# Patient Record
Sex: MALE | Race: BLACK OR AFRICAN AMERICAN | NOT HISPANIC OR LATINO | URBAN - METROPOLITAN AREA
[De-identification: names, ages, dates, MRNs, and addresses within clinical notes are randomized per-mention and may not be internally consistent; named-entity substitution may affect disease eponyms.]

---

## 2021-09-03 ENCOUNTER — EMERGENCY (EMERGENCY)
Facility: HOSPITAL | Age: 41
LOS: 1 days | Discharge: HOME | End: 2021-09-03
Attending: INTERNAL MEDICINE | Admitting: INTERNAL MEDICINE
Payer: COMMERCIAL

## 2021-09-03 VITALS
HEART RATE: 96 BPM | SYSTOLIC BLOOD PRESSURE: 127 MMHG | HEIGHT: 71 IN | WEIGHT: 229.94 LBS | DIASTOLIC BLOOD PRESSURE: 88 MMHG | OXYGEN SATURATION: 96 % | RESPIRATION RATE: 16 BRPM | TEMPERATURE: 100 F

## 2021-09-03 PROCEDURE — 99282 EMERGENCY DEPT VISIT SF MDM: CPT

## 2021-09-03 PROCEDURE — 99284 EMERGENCY DEPT VISIT MOD MDM: CPT

## 2021-09-03 NOTE — ED PROVIDER NOTE - CARE PROVIDER_API CALL
Ceasar Castañeda; PhD)  Infectious Disease; Internal Medicine  [address] [place] [address]  Phone: [phone]  Fax: [phone]  Follow Up Time: 1-3 Days

## 2021-09-03 NOTE — ED PROVIDER NOTE - NSICDXFAMILYHX_GEN_ALL_CORE_FT
FAMILY HISTORY:  Father  Still living? Unknown  FH: HTN (hypertension), Age at diagnosis: Age Unknown    Mother  Still living? Unknown  FH: HTN (hypertension), Age at diagnosis: Age Unknown    Sibling  Still living? Unknown  FH: HTN (hypertension), Age at diagnosis: Age Unknown

## 2021-09-03 NOTE — ED PROVIDER NOTE - NSFOLLOWUPINSTRUCTIONS_ED_ALL_ED_FT
At this time you have symptoms concerning for COVID 19. Currently you do not meet criteria for inpatient admission. You are being sent home at this time for home isolation. It is currently recommended at this time  that you isolate for the next 14 days unless further instructions are provided at a later date. All those who have been in close contact with you should also voluntary go on home isolation.   When home on home isolation please try to use your own bathroom and bedroom and limit contact with those around you as much as possible.   Contineu Tylenol per label intructions as needed for fever and body aches  Salt water rinses as needed for sore throat   Adance activity as tolerated  Please return to the ED for any concerning signs including increased difficulty breathing or signs of respiratory distress

## 2021-09-03 NOTE — ED PROVIDER NOTE - CLINICAL SUMMARY MEDICAL DECISION MAKING FREE TEXT BOX
fever found to be COVID positive,,,, cxr mild interstitial infiltrate , saturation stable.. fever controlled  stable for DC home with sat monitor and ID referral

## 2021-09-03 NOTE — ED PROVIDER NOTE - PATIENT PORTAL LINK FT
You can access the FollowMyHealth Patient Portal offered by [facility] by registering at the following website: [email or web address]. By joining [employer]’s FollowMyHealth portal, you will also be able to view your health information using other applications (apps) compatible with our system.

## 2021-09-03 NOTE — ED PROVIDER NOTE - OBJECTIVE STATEMENT
fever of 102. 4 days- came back from [place] on sunday-  fever 41 y/o male with fever x 4 days, temp max is  102 F, returned from [place] on Sunday, no cp, no sob no GI, no urinary symptoms, no stroke symptoms. He did not get good results in controlling the fever with Motrin until he began using Tylenol this evening.

## 2021-09-04 ENCOUNTER — EMERGENCY (EMERGENCY)
Facility: HOSPITAL | Age: 41
LOS: 1 days | Discharge: HOME | End: 2021-09-04
Attending: EMERGENCY MEDICINE | Admitting: EMERGENCY MEDICINE
Payer: COMMERCIAL

## 2021-09-04 VITALS
TEMPERATURE: 100 F | HEIGHT: 71 IN | SYSTOLIC BLOOD PRESSURE: 152 MMHG | OXYGEN SATURATION: 94 % | WEIGHT: 229.94 LBS | HEART RATE: 105 BPM | DIASTOLIC BLOOD PRESSURE: 85 MMHG | RESPIRATION RATE: 18 BRPM

## 2021-09-04 VITALS
TEMPERATURE: 99 F | OXYGEN SATURATION: 96 % | SYSTOLIC BLOOD PRESSURE: 135 MMHG | HEART RATE: 88 BPM | RESPIRATION RATE: 18 BRPM | DIASTOLIC BLOOD PRESSURE: 77 MMHG

## 2021-09-04 LAB
ALBUMIN SERPL ELPH-MCNC: 3.7 G/DL — SIGNIFICANT CHANGE UP (ref 3.3–5)
ALP SERPL-CCNC: 35 U/L — LOW (ref 40–120)
ALT FLD-CCNC: 42 U/L — SIGNIFICANT CHANGE UP (ref 12–78)
ANION GAP SERPL CALC-SCNC: 7 MMOL/L — SIGNIFICANT CHANGE UP (ref 5–17)
AST SERPL-CCNC: 54 U/L — HIGH (ref 15–37)
BASOPHILS # BLD AUTO: 0 K/UL — SIGNIFICANT CHANGE UP (ref 0–0.2)
BASOPHILS NFR BLD AUTO: 0 % — SIGNIFICANT CHANGE UP (ref 0–2)
BILIRUB SERPL-MCNC: 0.4 MG/DL — SIGNIFICANT CHANGE UP (ref 0.2–1.2)
BUN SERPL-MCNC: 11 MG/DL — SIGNIFICANT CHANGE UP (ref 7–23)
CALCIUM SERPL-MCNC: 8.4 MG/DL — LOW (ref 8.5–10.1)
CHLORIDE SERPL-SCNC: 102 MMOL/L — SIGNIFICANT CHANGE UP (ref 96–108)
CO2 SERPL-SCNC: 26 MMOL/L — SIGNIFICANT CHANGE UP (ref 22–31)
CREAT SERPL-MCNC: 1.2 MG/DL — SIGNIFICANT CHANGE UP (ref 0.5–1.3)
D DIMER BLD IA.RAPID-MCNC: <150 NG/ML DDU — SIGNIFICANT CHANGE UP
EOSINOPHIL # BLD AUTO: 0 K/UL — SIGNIFICANT CHANGE UP (ref 0–0.5)
EOSINOPHIL NFR BLD AUTO: 0 % — SIGNIFICANT CHANGE UP (ref 0–6)
GLUCOSE SERPL-MCNC: 94 MG/DL — SIGNIFICANT CHANGE UP (ref 70–99)
HCT VFR BLD CALC: 43.6 % — SIGNIFICANT CHANGE UP (ref 39–50)
HGB BLD-MCNC: 13.8 G/DL — SIGNIFICANT CHANGE UP (ref 13–17)
LYMPHOCYTES # BLD AUTO: 1.87 K/UL — SIGNIFICANT CHANGE UP (ref 1–3.3)
LYMPHOCYTES # BLD AUTO: 33 % — SIGNIFICANT CHANGE UP (ref 13–44)
MCHC RBC-ENTMCNC: 22.8 PG — LOW (ref 27–34)
MCHC RBC-ENTMCNC: 31.7 GM/DL — LOW (ref 32–36)
MCV RBC AUTO: 72.2 FL — LOW (ref 80–100)
MONOCYTES # BLD AUTO: 0.4 K/UL — SIGNIFICANT CHANGE UP (ref 0–0.9)
MONOCYTES NFR BLD AUTO: 7 % — SIGNIFICANT CHANGE UP (ref 2–14)
NEUTROPHILS # BLD AUTO: 2.89 K/UL — SIGNIFICANT CHANGE UP (ref 1.8–7.4)
NEUTROPHILS NFR BLD AUTO: 49 % — SIGNIFICANT CHANGE UP (ref 43–77)
NRBC # BLD: SIGNIFICANT CHANGE UP /100 WBCS (ref 0–0)
PLATELET # BLD AUTO: 193 K/UL — SIGNIFICANT CHANGE UP (ref 150–400)
POTASSIUM SERPL-MCNC: 4.7 MMOL/L — SIGNIFICANT CHANGE UP (ref 3.5–5.3)
POTASSIUM SERPL-SCNC: 4.7 MMOL/L — SIGNIFICANT CHANGE UP (ref 3.5–5.3)
PROT SERPL-MCNC: 8.4 G/DL — HIGH (ref 6–8.3)
RBC # BLD: 6.04 M/UL — HIGH (ref 4.2–5.8)
RBC # FLD: 17 % — HIGH (ref 10.3–14.5)
SARS-COV-2 RNA SPEC QL NAA+PROBE: DETECTED
SODIUM SERPL-SCNC: 135 MMOL/L — SIGNIFICANT CHANGE UP (ref 135–145)
WBC # BLD: 5.67 K/UL — SIGNIFICANT CHANGE UP (ref 3.8–10.5)
WBC # FLD AUTO: 5.67 K/UL — SIGNIFICANT CHANGE UP (ref 3.8–10.5)

## 2021-09-04 PROCEDURE — 99285 EMERGENCY DEPT VISIT HI MDM: CPT

## 2021-09-04 PROCEDURE — 71045 X-RAY EXAM CHEST 1 VIEW: CPT

## 2021-09-04 PROCEDURE — 80053 COMPREHEN METABOLIC PANEL: CPT

## 2021-09-04 PROCEDURE — 85379 FIBRIN DEGRADATION QUANT: CPT

## 2021-09-04 PROCEDURE — 71045 X-RAY EXAM CHEST 1 VIEW: CPT | Mod: 26

## 2021-09-04 PROCEDURE — 36415 COLL VENOUS BLD VENIPUNCTURE: CPT

## 2021-09-04 PROCEDURE — 87635 SARS-COV-2 COVID-19 AMP PRB: CPT

## 2021-09-04 PROCEDURE — 99285 EMERGENCY DEPT VISIT HI MDM: CPT | Mod: 25

## 2021-09-04 PROCEDURE — 85025 COMPLETE CBC W/AUTO DIFF WBC: CPT

## 2021-09-04 PROCEDURE — 93010 ELECTROCARDIOGRAM REPORT: CPT

## 2021-09-04 PROCEDURE — 96360 HYDRATION IV INFUSION INIT: CPT

## 2021-09-04 PROCEDURE — 93005 ELECTROCARDIOGRAM TRACING: CPT

## 2021-09-04 RX ORDER — ACETAMINOPHEN 500 MG
650 TABLET ORAL ONCE
Refills: 0 | Status: COMPLETED | OUTPATIENT
Start: 2021-09-04 | End: 2021-09-04

## 2021-09-04 RX ORDER — SODIUM CHLORIDE 9 MG/ML
1000 INJECTION INTRAMUSCULAR; INTRAVENOUS; SUBCUTANEOUS ONCE
Refills: 0 | Status: COMPLETED | OUTPATIENT
Start: 2021-09-04 | End: 2021-09-04

## 2021-09-04 RX ADMIN — Medication 650 MILLIGRAM(S): at 20:44

## 2021-09-04 RX ADMIN — SODIUM CHLORIDE 1000 MILLILITER(S): 9 INJECTION INTRAMUSCULAR; INTRAVENOUS; SUBCUTANEOUS at 20:13

## 2021-09-04 RX ADMIN — SODIUM CHLORIDE 1000 MILLILITER(S): 9 INJECTION INTRAMUSCULAR; INTRAVENOUS; SUBCUTANEOUS at 21:14

## 2021-09-04 RX ADMIN — Medication 650 MILLIGRAM(S): at 20:13

## 2021-09-04 NOTE — ED ADULT NURSE NOTE - NSIMPLEMENTINTERV_GEN_ALL_ED
Implemented All Universal Safety Interventions:  [place] to call system. Call bell, personal items and telephone within reach. Instruct patient to call for assistance. Room bathroom lighting operational. Non-slip footwear when patient is off stretcher. Physically safe environment: no spills, clutter or unnecessary equipment. Stretcher in lowest position, wheels locked, appropriate side rails in place.

## 2021-09-04 NOTE — ED ADULT NURSE NOTE - OBJECTIVE STATEMENT
Patient A/Ox4. Comes in Covid + since 08/31. Says he has had SOB, dizziness, vomiting, fever and generalized fatigue that has not gotten better. Currently he is 94-96% on room air. Breathing even and unlabored. Patient speaking in full sentences. IV/labs/meds/fluids/EKG done per physician orders. Call light in reach. Continuous pulse oximeter on. Will continue to monitor.

## 2021-09-04 NOTE — ED PROVIDER NOTE - PATIENT PORTAL LINK FT
You can access the FollowMyHealth Patient Portal offered by [facility] by registering at the following website: [email or web address]. By joining [employer]’s FollowMyHealth portal, you will also be able to view your health information using other applications (apps) compatible with our system.

## 2021-09-04 NOTE — ED PROVIDER NOTE - OBJECTIVE STATEMENT
39 y/o M + Covid 8/31 with c/o hypoxia at home 83-88% and fever x 3 days.  pt was seen in the ED 2 days ago and was discharged without further intervention.  Pt is not vaccinated

## 2021-09-06 ENCOUNTER — INPATIENT (INPATIENT)
Facility: HOSPITAL | Age: 41
LOS: 2 days | Discharge: HOME | DRG: 177 | End: 2021-09-09
Attending: FAMILY MEDICINE | Admitting: STUDENT IN AN ORGANIZED HEALTH CARE EDUCATION/TRAINING PROGRAM
Payer: COMMERCIAL

## 2021-09-06 VITALS
SYSTOLIC BLOOD PRESSURE: 137 MMHG | TEMPERATURE: 99 F | RESPIRATION RATE: 20 BRPM | WEIGHT: 199.96 LBS | DIASTOLIC BLOOD PRESSURE: 79 MMHG | HEART RATE: 109 BPM | HEIGHT: 71 IN | OXYGEN SATURATION: 89 %

## 2021-09-06 DIAGNOSIS — N50.819 TESTICULAR PAIN, UNSPECIFIED: ICD-10-CM

## 2021-09-06 DIAGNOSIS — Z29.9 ENCOUNTER FOR PROPHYLACTIC MEASURES, UNSPECIFIED: ICD-10-CM

## 2021-09-06 DIAGNOSIS — U07.1 COVID-19: ICD-10-CM

## 2021-09-06 LAB
ALBUMIN SERPL ELPH-MCNC: 3.7 G/DL — SIGNIFICANT CHANGE UP (ref 3.3–5)
ALP SERPL-CCNC: 39 U/L — LOW (ref 40–120)
ALT FLD-CCNC: 47 U/L — SIGNIFICANT CHANGE UP (ref 12–78)
ANION GAP SERPL CALC-SCNC: 7 MMOL/L — SIGNIFICANT CHANGE UP (ref 5–17)
AST SERPL-CCNC: 67 U/L — HIGH (ref 15–37)
BILIRUB SERPL-MCNC: 0.4 MG/DL — SIGNIFICANT CHANGE UP (ref 0.2–1.2)
BUN SERPL-MCNC: 10 MG/DL — SIGNIFICANT CHANGE UP (ref 7–23)
CALCIUM SERPL-MCNC: 8.6 MG/DL — SIGNIFICANT CHANGE UP (ref 8.5–10.1)
CHLORIDE SERPL-SCNC: 101 MMOL/L — SIGNIFICANT CHANGE UP (ref 96–108)
CO2 SERPL-SCNC: 26 MMOL/L — SIGNIFICANT CHANGE UP (ref 22–31)
CREAT SERPL-MCNC: 1.2 MG/DL — SIGNIFICANT CHANGE UP (ref 0.5–1.3)
CRP SERPL-MCNC: 72 MG/L — HIGH
D DIMER BLD IA.RAPID-MCNC: <150 NG/ML DDU — SIGNIFICANT CHANGE UP
FERRITIN SERPL-MCNC: 696 NG/ML — HIGH (ref 30–400)
GLUCOSE SERPL-MCNC: 109 MG/DL — HIGH (ref 70–99)
POTASSIUM SERPL-MCNC: 4.2 MMOL/L — SIGNIFICANT CHANGE UP (ref 3.5–5.3)
POTASSIUM SERPL-SCNC: 4.2 MMOL/L — SIGNIFICANT CHANGE UP (ref 3.5–5.3)
PROCALCITONIN SERPL-MCNC: 0.14 NG/ML — HIGH (ref 0–0.04)
PROT SERPL-MCNC: 8.6 G/DL — HIGH (ref 6–8.3)
SARS-COV-2 RNA SPEC QL NAA+PROBE: DETECTED
SODIUM SERPL-SCNC: 134 MMOL/L — LOW (ref 135–145)

## 2021-09-06 PROCEDURE — 93010 ELECTROCARDIOGRAM REPORT: CPT

## 2021-09-06 PROCEDURE — 99285 EMERGENCY DEPT VISIT HI MDM: CPT

## 2021-09-06 PROCEDURE — 71045 X-RAY EXAM CHEST 1 VIEW: CPT | Mod: 26

## 2021-09-06 PROCEDURE — 99222 1ST HOSP IP/OBS MODERATE 55: CPT

## 2021-09-06 RX ORDER — REMDESIVIR 5 MG/ML
100 INJECTION INTRAVENOUS EVERY 24 HOURS
Refills: 0 | Status: DISCONTINUED | OUTPATIENT
Start: 2021-09-07 | End: 2021-09-09

## 2021-09-06 RX ORDER — DEXAMETHASONE 0.5 MG/5ML
6 ELIXIR ORAL DAILY
Refills: 0 | Status: DISCONTINUED | OUTPATIENT
Start: 2021-09-07 | End: 2021-09-09

## 2021-09-06 RX ORDER — SODIUM CHLORIDE 9 MG/ML
1000 INJECTION INTRAMUSCULAR; INTRAVENOUS; SUBCUTANEOUS ONCE
Refills: 0 | Status: COMPLETED | OUTPATIENT
Start: 2021-09-06 | End: 2021-09-06

## 2021-09-06 RX ORDER — ACETAMINOPHEN 500 MG
650 TABLET ORAL EVERY 6 HOURS
Refills: 0 | Status: DISCONTINUED | OUTPATIENT
Start: 2021-09-06 | End: 2021-09-09

## 2021-09-06 RX ORDER — DEXAMETHASONE 0.5 MG/5ML
6 ELIXIR ORAL ONCE
Refills: 0 | Status: COMPLETED | OUTPATIENT
Start: 2021-09-06 | End: 2021-09-06

## 2021-09-06 RX ORDER — ENOXAPARIN SODIUM 100 MG/ML
40 INJECTION SUBCUTANEOUS DAILY
Refills: 0 | Status: DISCONTINUED | OUTPATIENT
Start: 2021-09-06 | End: 2021-09-09

## 2021-09-06 RX ORDER — REMDESIVIR 5 MG/ML
INJECTION INTRAVENOUS
Refills: 0 | Status: DISCONTINUED | OUTPATIENT
Start: 2021-09-06 | End: 2021-09-09

## 2021-09-06 RX ORDER — INFLUENZA VIRUS VACCINE 15; 15; 15; 15 UG/.5ML; UG/.5ML; UG/.5ML; UG/.5ML
0.5 SUSPENSION INTRAMUSCULAR ONCE
Refills: 0 | Status: DISCONTINUED | OUTPATIENT
Start: 2021-09-06 | End: 2021-09-09

## 2021-09-06 RX ORDER — REMDESIVIR 5 MG/ML
200 INJECTION INTRAVENOUS EVERY 24 HOURS
Refills: 0 | Status: COMPLETED | OUTPATIENT
Start: 2021-09-06 | End: 2021-09-06

## 2021-09-06 RX ADMIN — ENOXAPARIN SODIUM 40 MILLIGRAM(S): 100 INJECTION SUBCUTANEOUS at 14:05

## 2021-09-06 RX ADMIN — SODIUM CHLORIDE 1000 MILLILITER(S): 9 INJECTION INTRAMUSCULAR; INTRAVENOUS; SUBCUTANEOUS at 06:00

## 2021-09-06 RX ADMIN — REMDESIVIR 500 MILLIGRAM(S): 5 INJECTION INTRAVENOUS at 07:05

## 2021-09-06 RX ADMIN — Medication 6 MILLIGRAM(S): at 06:00

## 2021-09-06 NOTE — CHART NOTE - NSCHARTNOTEFT_GEN_A_CORE
39 yo M with no significant PMHx admitted for acute hypoxic respiratory failure 2/2 COVID-19 pneumonia. RN called regarding patient having "difficulty swallowing" and concern that his "throat was closing up. Pt was seen and examined at the bedside. He states that he noticed his difficulty swallowing after having dinner, which consisted of dry chicken. He denies any chest pain or difficulty breathing, and does not feel like food is getting stuck in his throat while swallowing. He denies any difficulty moving air in or out of his lungs, or any wheezing. His throat feels dry, but feels a little better if he drinks water. He denies any allergies to food or medication, and states he snores at night and has had a sleep study in the past.    Vital Signs Last 24 Hrs  T(C): 36.9 (06 Sep 2021 13:16), Max: 37.6 (06 Sep 2021 08:58)  T(F): 98.4 (06 Sep 2021 13:16), Max: 99.7 (06 Sep 2021 08:58)  HR: 104 (06 Sep 2021 13:16) (91 - 109)  BP: 153/94 (06 Sep 2021 13:16) (130/80 - 153/94)  RR: 18 (06 Sep 2021 13:16) (18 - 20)  SpO2: 90% (06 Sep 2021 13:16) (89% - 96%) on 2L nasal cannula    Physical Exam:  General: Lying in bed in NAD  HEENT: No pharyngeal erythema or edema noted  Neuro: Responding to questions and commands appropriately, moving all extremities.  Respiratory: On nasal cannula, no increased work of breathing. Decreased breath sounds at the bases but otherwise CTA b/l. No inspiratory or expiratory stridor.   CV: Tachycardic, regular rhythm. S1/S2, no murmurs, rubs or gallops.     Assessment/Plan:  39 yo M, unvaccinated for COVID, with no significant PMHx, admitted for acute hypoxic respiratory failure 2/2 COVID-19 pneumonia, now on nasal cannula and remdesivir/dexamethasone. RN called for patient complaining of difficulty swallowing.   - Appears to have stemmed from dry mouth/throat in the setting of nasal cannula and from decreased PO water intake  - No stridor appreciated on exam, very low likelihood for upper airway edema or obstruction; pt will alert RN if any changes to his breathing patterns or ability to breathe  - If pt has continued difficulties swallowing, consider speech/swallow evaluation  - RN to call for any changes 39 yo M with no significant PMHx admitted for acute hypoxic respiratory failure 2/2 COVID-19 pneumonia. RN called regarding patient having "difficulty swallowing" and concern that his "throat was closing up. Pt was seen and examined at the bedside. He states that he noticed his difficulty swallowing after having dinner, which consisted of dry chicken. He denies any chest pain or difficulty breathing, and does not feel like food is getting stuck in his throat while swallowing. He denies any difficulty moving air in or out of his lungs, or any wheezing. His throat feels dry, but feels a little better if he drinks water. He denies any allergies to food or medication, and states he snores at night and has had a sleep study in the past.    Vital Signs Last 24 Hrs  T(C): 36.9 (06 Sep 2021 13:16), Max: 37.6 (06 Sep 2021 08:58)  T(F): 98.4 (06 Sep 2021 13:16), Max: 99.7 (06 Sep 2021 08:58)  HR: 104 (06 Sep 2021 13:16) (91 - 109)  BP: 153/94 (06 Sep 2021 13:16) (130/80 - 153/94)  RR: 18 (06 Sep 2021 13:16) (18 - 20)  SpO2: 90% (06 Sep 2021 13:16) (89% - 96%) on 2L nasal cannula    Physical Exam:  General: Lying in bed in NAD  HEENT: No pharyngeal erythema or edema noted  Neuro: Responding to questions and commands appropriately, moving all extremities.  Respiratory: On nasal cannula, no increased work of breathing. Decreased breath sounds at the bases but otherwise CTA b/l. No inspiratory or expiratory stridor.   CV: Tachycardic, regular rhythm. S1/S2, no murmurs, rubs or gallops.     Assessment/Plan:  39 yo M, unvaccinated for COVID, with no significant PMHx, admitted for acute hypoxic respiratory failure 2/2 COVID-19 pneumonia, now on nasal cannula and remdesivir/dexamethasone. RN called for patient complaining of difficulty swallowing.   - Appears to have stemmed from dry mouth/throat in the setting of nasal cannula and from decreased PO water intake  - No stridor appreciated on exam, very low likelihood for upper airway edema or obstruction; pt will alert RN if any changes to his breathing patterns or ability to breathe  - If pt has continued difficulties swallowing, consider speech/swallow evaluation, if throat is still dry can switch to humidified oxygen  - RN to call for any changes

## 2021-09-06 NOTE — H&P ADULT - NSHPSOCIALHISTORY_GEN_ALL_CORE
Denies tobacco, recreational drug use  Drinks 1 beer per month    Ambulates unassisted, independent with ADLs.  Lives with his 5 children.

## 2021-09-06 NOTE — CONSULT NOTE ADULT - SUBJECTIVE AND OBJECTIVE BOX
[facility]  INFECTIOUS DISEASES   =======================================================    [facility]-[id number]  YOSSI MICHELLE     CC: COVID-19     HPI:  41 yo man with no significant PMH was admitted with hypoxia. Recently diagnosed with COVID-19 on 08/31.   Symptoms started on 8/25 as per him.   Had traveled to [place] and returned on 08/29.   Not vaccinated for COVID-19.   Received a course of Azithromycin started on 9/1 which he completed.   Was in the ED 9/3 and 9/4 but was discharged home and did not require O2 at that time. He reports oxygen desaturations to <90% at home with fevers ranging 101F - 104F per pt. He has had increased dyspnea on exertion, diarrhea.    PAST MEDICAL & SURGICAL HISTORY:  No pertinent past medical history  No significant past surgical history    Social Hx: no smoking, ETOH or drugs     FAMILY HISTORY:  FH: HTN (hypertension) (Father, Mother, Sibling)    Allergies  No Known Allergies    Antibiotics:  MEDICATIONS  (STANDING):  dexAMETHasone  Injectable 6 milliGRAM(s) IV Push daily  enoxaparin Injectable 40 milliGRAM(s) SubCutaneous daily  remdesivir  IVPB   IV Intermittent     REVIEW OF SYSTEMS:  CONSTITUTIONAL:  No Fever or chills  HEENT:  No diplopia or blurred vision.  No sore throat or runny nose.  CARDIOVASCULAR:  No chest pain or SOB.  RESPIRATORY:  +cough, +shortness of breath  GASTROINTESTINAL:  No nausea, vomiting or diarrhea.  GENITOURINARY:  No dysuria, frequency or urgency. No Blood in urine  MUSCULOSKELETAL:  no joint aches, no muscle pain  SKIN:  No change in skin, hair or nails.  NEUROLOGIC:  No paresthesias, fasciculations, seizures or weakness.  PSYCHIATRIC:  No disorder of thought or mood.  ENDOCRINE:  No heat or cold intolerance, polyuria or polydipsia.  HEMATOLOGICAL:  No easy bruising or bleeding.     Physical Exam:  Vital Signs Last 24 Hrs  T(C): 37.6 (06 Sep 2021 08:58), Max: 37.6 (06 Sep 2021 08:58)  T(F): 99.7 (06 Sep 2021 08:58), Max: 99.7 (06 Sep 2021 08:58)  HR: 91 (06 Sep 2021 08:58) (91 - 109)  BP: 130/80 (06 Sep 2021 08:58) (130/80 - 137/79)  RR: 18 (06 Sep 2021 08:58) (18 - 20)  SpO2: 95% (06 Sep 2021 08:58) (89% - 96%)  Height (cm): 180.3 (09-06 @ 05:19)  Weight (kg): 90.7 (09-06 @ 05:19)  BMI (kg/m2): 27.9 (09-06 @ 05:19)  BSA (m2): 2.11 (09-06 @ 05:19)  GEN: NAD, obese   HEENT: normocephalic and atraumatic. EOMI. PERRL.    NECK: Supple.  No lymphadenopathy   LUNGS: Clear to auscultation.   HEART: Regular rate and rhythm   ABDOMEN: Soft, nontender, and nondistended.  Positive bowel sounds.    EXTREMITIES: Without edema.  NEUROLOGIC: grossly intact.  PSYCHIATRIC: Appropriate affect .  SKIN: No rash    Labs:  09-06    134<L>  |  101  |  10  ----------------------------<  109<H>  4.2   |  26  |  1.20    Ca    8.6      06 Sep 2021 06:01    TPro  8.6<H>  /  Alb  3.7  /  TBili  0.4  /  DBili  x   /  AST  67<H>  /  ALT  47  /  AlkPhos  39<L>  09-06                        13.9   7.31  )-----------( 183      ( 06 Sep 2021 09:39 )             42.7     LIVER FUNCTIONS - ( 06 Sep 2021 06:01 )  Alb: 3.7 g/dL / Pro: 8.6 g/dL / ALK PHOS: 39 U/L / ALT: 47 U/L / AST: 67 U/L / GGT: x           All imaging and other data have been reviewed.  < from: Xray Chest 1 View- PORTABLE-Urgent (09.04.21 @ 20:10) >  IMPRESSION: Patchy infiltrate/atelectasis left base. Recommend clinical correlation and follow-up    Assessment and Plan:   41 yo man with no significant PMH was admitted with hypoxia. Recently diagnosed with COVID-19 on 08/31.   Symptoms started on 8/25 as per him.   Not vaccinated for COVID-19. On 2L O2 with NC good sat.   Treatment usually is different case by case, data suggest that these might work:   Remdisivir:  5 day course , ALT < 5X ULN  Steroid: For hypoxic patients on supplemental O2 of intubated. dexamethasone 6mg PO or IV Q-day x 10 days (equivalent to solumedrol 32mg IV or Prednisone 40mg)  Anticoagulation:  with prophylactic dosing, full dose to be considered in patients with increased risk for thromboembolic complications. Bleeding can happen but acceptable in high risk patients due to hypercoagulable state.  LMWH is good, for high risk patients consider discharge on oral anticoagulation with rivaroxaban (Xarelto) 10mg PO QD or Eliquis (apixaban) 2.5-5mg PO BID  x 4 weeks.  Currently data and recommendations for COVID-19 treatment are rapidly changing, so this treatment plan is based on my clinical judgement with available information.     COVID 19   - BMP, CBC w diff, NLR. Procalcitonin, Ferritin, CRP, LDH and D dimer for the start and periodically can be repeated.   - CXR with bilateral opacities more consistent with viral pneumonia   - Start Remdesivir 200mg stat and 100mg daily for 4 more days (total 5days) or until discharge whichever comes first.   - Start Dexamethasone 6mg po daily for 10days  - Follow Creat and LFTs daily while on Remdesivir.    - Watch O2 sat closely and taper as tolerated.   - No antibiotics at this time.  - Prophylactic anticoagulation as per protocol  - No Tocilizumab at this time.     Thank you for courtesy of this consult.     Will follow.     Dr. Sophia Martinez   Infectious Diseases   Please call [phone] between 8am and 6pm, call [phone] after 6pm or weekends.

## 2021-09-06 NOTE — ED ADULT NURSE REASSESSMENT NOTE - COMFORT CARE
meal provided/po fluids offered/repositioned/side rails up/wait time explained/warm blanket provided

## 2021-09-06 NOTE — ED PROVIDER NOTE - PHYSICAL EXAMINATION
Gen: Alert, NAD  Head/eyes: NC/AT, PERRL  ENT: airway patent  Neck: supple, no tenderness/meningismus/JVD, Trachea midline  Pulm/lung: decreased breath sounds b/l bases, normal resp effort, no wheeze/stridor/retractions  CV/heart: RRR, no M/R/G, +2 dist pulses (radial, pedal DP/PT, popliteal)  GI/Abd: soft, NT/ND, +BS, no guarding/rebound tenderness  Musculoskeletal: no edema/erythema/cyanosis, FROM in all extremities, no C/T/L spine ttp  Skin: no rash, no vesicles, no petechaie, no ecchymosis, no swelling  Neuro: AAOx3, CN 2-12 intact, normal sensation, 5/5 motor strength in all extremities, normal gait, no dysmetria

## 2021-09-06 NOTE — H&P ADULT - NSHPLABSRESULTS_GEN_ALL_CORE
13.8   5.67  )-----------( 193      ( 04 Sep 2021 20:19 )             43.6     06 Sep 2021 06:01    134    |  101    |  10     ----------------------------<  109    4.2     |  26     |  1.20     Ca    8.6        06 Sep 2021 06:01    TPro  8.6    /  Alb  3.7    /  TBili  0.4    /  DBili  x      /  AST  67     /  ALT  47     /  AlkPhos  39     06 Sep 2021 06:01    LIVER FUNCTIONS - ( 06 Sep 2021 06:01 )  Alb: 3.7 g/dL / Pro: 8.6 g/dL / ALK PHOS: 39 U/L / ALT: 47 U/L / AST: 67 U/L / GGT: x             CAPILLARY BLOOD GLUCOSE

## 2021-09-06 NOTE — H&P ADULT - PROBLEM SELECTOR PLAN 1
acute hypoxic respiratory failure 2/2 COVID-19 pneumonia  - admit w/ remote telemetry monitoring  - patient currently saturating well on 2LNC  - supplemental O2 PRN to maintain O2 sats >90%, escalate therapy as appropriate  - monitor volume status closely, will exercise caution with aggressive hydration  - tylenol prn myalgias, fever  - check QTc: _____  - daily labs to include: CBC with differential along with a CMP  - ferritin, crp, d-dimer, procal at admission then will repeat as clinically warranted  - will initiate VTE ppx with lovenox 40 mg qd  - patient to be started on dexamethasone and remdesivir, 1st dose 9/6  - no indication for tocilizumab at this time  - advanced care planning/code status: full code  - this tx plan was formulated utilizing my clinical judgement, currently available local/regional anecdotal information, organizational treatment recommendations with COVID-19 specific considerations given rapidly changing information available acute hypoxic respiratory failure 2/2 COVID-19 pneumonia  - admit w/ remote telemetry monitoring  - patient currently saturating well on 2LNC  - supplemental O2 PRN to maintain O2 sats >90%, escalate therapy as appropriate  - monitor volume status closely, will exercise caution with aggressive hydration  - tylenol prn myalgias, fever  - check QTc: 392 on EKG from 9/4, new EKG pending  - daily labs to include: CBC with differential along with a CMP  - ferritin, crp, d-dimer, procal at admission then will repeat as clinically warranted  - will initiate VTE ppx with lovenox 40 mg qd  - patient to be started on dexamethasone and remdesivir, 1st dose 9/6  - no indication for tocilizumab at this time  - advanced care planning/code status: full code  - this tx plan was formulated utilizing my clinical judgement, currently available local/regional anecdotal information, organizational treatment recommendations with COVID-19 specific considerations given rapidly changing information available

## 2021-09-06 NOTE — CONSULT NOTE ADULT - ASSESSMENT
pt with covid - viral pna - natalee - obesity - hypoxemia    o2 support  fio2 titration  pronate as tolerated  monitor VS and HD  decadron and remdesivir - for covid - viral pna with hypoxemia -   known NATALEE - does not use Device - sleep hygiene reviewed  tylenol and robitussin PRN use for sx management  dvt p  serial D dimer  emotional support

## 2021-09-06 NOTE — ED ADULT NURSE NOTE - OBJECTIVE STATEMENT
Pt to ED c/c low oxygen level secondary to Covid infection. Pt has been seen in this ED 2 x prior with covid symptoms. Pt states he has difficulty managing fever at home, and this morning noted with his O2 level at 89%. Pt denies SOB, states he does not have cough. BBS CTA

## 2021-09-06 NOTE — H&P ADULT - PROBLEM SELECTOR PLAN 2
lovenox 40 mg qd for dvt ppx - pt c/o testicular pain, L>R with mild L testicular tenderness on exam  - will order testicular ultrasound

## 2021-09-06 NOTE — ED ADULT TRIAGE NOTE - CHIEF COMPLAINT QUOTE
Patient Covid + since 08/31. Was seen in this ED a few days ago with stable O2. O2 sat now 89% on room air. Endorses fever, dizziness and fatigue persistent. Took Tylenol PTA. Patient taken straight to room to finish triage.

## 2021-09-06 NOTE — CONSULT NOTE ADULT - SUBJECTIVE AND OBJECTIVE BOX
Date/Time Patient Seen:  		  Referring MD:   Data Reviewed	       Patient is a 40y old  Male who presents with a chief complaint of acute hypoxic resp failure 2/2 COVID-19 (06 Sep 2021 09:56)      Subjective/HPI  in bed  seen and examined  vs noted  covid pos  on o2 support  lives with family  h and p reviewed  er provider note reviewed         PAST MEDICAL & SURGICAL HISTORY:  No pertinent past medical history    No significant past surgical history    39 yo M, unvaccinated for COVID, with no significant PMHx presents to the ED c/o hypoxia. Recently diagnosed with COVID-19 on 08/31. Had traveled to [place] and returned on 08/29. Not vaccinated for COVID-19. Received a course of Azithromycin started on 9/1 which he completed today. Has been taking Tylenol as needed with minimal relief.   Was in the ED 9/3 and 9/4 but was discharged home and did not require O2 at that time. He reports oxygen desaturations to <90% at home with fevers ranging 101F - 104F per pt. He has had increased dyspnea on exertion, diarrhea (nonbloody), loss of taste, but denies loss of smell.     In the ED, VS T99.2F  -> 97 /79 RR 20 SpO2 89% on RA -> 96% on 2LNC. CBC, CMP, CRP, Ferritin, Procal, D-dimer collected in the ED. Significant for   Received 6 mg Dexamethasone IV x1, 1L NS bolus x1 in the ED.  CXR showing L base infiltrate, official read pending  EKG from prior visit reviewed showing NSR with nonspecific T wave abnormality, no prior EKG for comparison, new EKG pending    FAMILY HISTORY:  Father  Still living? Unknown  FH: HTN (hypertension), Age at diagnosis: Age Unknown    Mother  Still living? Unknown  FH: HTN (hypertension), Age at diagnosis: Age Unknown    Sibling  Still living? Unknown  FH: HTN (hypertension), Age at diagnosis: Age Unknown.     Social History:  Social History (marital status, living situation, occupation, tobacco use, alcohol and drug use, and sexual history): Denies tobacco, recreational drug use  Drinks 1 beer per month    Ambulates unassisted, independent with ADLs.  Lives with his 5 children.     Tobacco Screening:  · Core Measure Site	Yes  · Has the patient used tobacco in the past 30 days?	No    Risk Assessment:    Present on Admission:  Deep Venous Thrombosis	no  Pulmonary Embolus	no     Heart Failure:  Does this patient have a history of or has been diagnosed with heart failure? no.    HIV Screen (per [facility], HIV screening must be offered to every individual between ages 13 and 64)	Unable to offer due to clinical condition        Medication list         MEDICATIONS  (STANDING):  dexAMETHasone  Injectable 6 milliGRAM(s) IV Push daily  enoxaparin Injectable 40 milliGRAM(s) SubCutaneous daily  influenza   Vaccine 0.5 milliLiter(s) IntraMuscular once  remdesivir  IVPB   IV Intermittent     MEDICATIONS  (PRN):  acetaminophen   Tablet .. 650 milliGRAM(s) Oral every 6 hours PRN Temp greater or equal to 38C (100.4F)         Vitals log        ICU Vital Signs Last 24 Hrs  T(C): 37.2 (06 Sep 2021 10:03), Max: 37.6 (06 Sep 2021 08:58)  T(F): 99 (06 Sep 2021 10:03), Max: 99.7 (06 Sep 2021 08:58)  HR: 102 (06 Sep 2021 10:03) (91 - 109)  BP: 142/83 (06 Sep 2021 10:03) (130/80 - 142/83)  BP(mean): --  ABP: --  ABP(mean): --  RR: 18 (06 Sep 2021 10:03) (18 - 20)  SpO2: 89% (06 Sep 2021 10:03) (89% - 96%)           Input and Output:  I&O's Detail      Lab Data                        13.9   7.31  )-----------( 183      ( 06 Sep 2021 09:39 )             42.7     09-06    134<L>  |  101  |  10  ----------------------------<  109<H>  4.2   |  26  |  1.20    Ca    8.6      06 Sep 2021 06:01    TPro  8.6<H>  /  Alb  3.7  /  TBili  0.4  /  DBili  x   /  AST  67<H>  /  ALT  47  /  AlkPhos  39<L>  09-06            Review of Systems	  cough      Objective     Physical Examination    heart s1s2  lung dec BS  abd soft  head nc  obese  cn grossly int  verbal  alert  moves all extr      Pertinent Lab findings & Imaging      Bautista:  NO   Adequate UO     I&O's Detail           Discussed with:     Cultures:	        Radiology        EXAM:  XR CHEST AP OR PA 1V                            PROCEDURE DATE:  09/06/2021          INTERPRETATION:  Cough.    AP chest. Prior 9/4/2021.    IMPRESSION: Patchy infiltrate left base similar to prior possible Covid 19 infection. No pleural effusion or other new abnormality.    --- End of Report ---            ANGIE ONTIVEROS MD; Attending Radiologist  This document has been electronically signed. Sep  6 2021 10:39AM

## 2021-09-06 NOTE — H&P ADULT - HISTORY OF PRESENT ILLNESS
39 yo M with no significant PMHx presents to the ED c/o hypoxia. Recently diagnosed with COVID-19 on 08/31. Had traveled to [place] and returned on 08/29. Not vaccinated for COVID-19. Received a course of Azithromycin started on 9/1 which he completed today.  Was in the ED 9/4 and 9/5 but was discharged home and did not require O2 at that time.     In the ED, VS T99.2F  -> 97 /79 RR 20 SpO2 89% on RA -> 96% on 2LNC. CBC, CMP, CRP, Ferritin, Procal, D-dimer collected in the ED. Significant for   Received 6 mg Dexamethasone IV x1, 1L NS bolus x1 in the ED.  CXR showing L base infiltrate, official read pending  EKG showing __________ 39 yo M with no significant PMHx presents to the ED c/o hypoxia. Recently diagnosed with COVID-19 on 08/31. Had traveled to [place] and returned on 08/29. Not vaccinated for COVID-19. Received a course of Azithromycin started on 9/1 which he completed today.  Was in the ED 9/3 and 9/4 but was discharged home and did not require O2 at that time.     In the ED, VS T99.2F  -> 97 /79 RR 20 SpO2 89% on RA -> 96% on 2LNC. CBC, CMP, CRP, Ferritin, Procal, D-dimer collected in the ED. Significant for   Received 6 mg Dexamethasone IV x1, 1L NS bolus x1 in the ED.  CXR showing L base infiltrate, official read pending  EKG showing __________ 39 yo M with no significant PMHx presents to the ED c/o hypoxia. Recently diagnosed with COVID-19 on 08/31. Had traveled to [place] and returned on 08/29. Not vaccinated for COVID-19. Received a course of Azithromycin started on 9/1 which he completed today. Has been taking Tylenol as needed with minimal relief.   Was in the ED 9/3 and 9/4 but was discharged home and did not require O2 at that time.     In the ED, VS T99.2F  -> 97 /79 RR 20 SpO2 89% on RA -> 96% on 2LNC. CBC, CMP, CRP, Ferritin, Procal, D-dimer collected in the ED. Significant for   Received 6 mg Dexamethasone IV x1, 1L NS bolus x1 in the ED.  CXR showing L base infiltrate, official read pending  EKG showing __________ 39 yo M, unvaccinated for COVID, with no significant PMHx presents to the ED c/o hypoxia. Recently diagnosed with COVID-19 on 08/31. Had traveled to [place] and returned on 08/29. Not vaccinated for COVID-19. Received a course of Azithromycin started on 9/1 which he completed today. Has been taking Tylenol as needed with minimal relief.   Was in the ED 9/3 and 9/4 but was discharged home and did not require O2 at that time. He reports oxygen desaturations to <90% at home with fevers ranging 101F - 104F per pt. He has had increased dyspnea on exertion, diarrhea (nonbloody), loss of taste, but denies loss of smell.     In the ED, VS T99.2F  -> 97 /79 RR 20 SpO2 89% on RA -> 96% on 2LNC. CBC, CMP, CRP, Ferritin, Procal, D-dimer collected in the ED. Significant for   Received 6 mg Dexamethasone IV x1, 1L NS bolus x1 in the ED.  CXR showing L base infiltrate, official read pending  EKG from prior visit reviewed showing NSR with nonspecific T wave abnormality, no prior EKG for comparison, new EKG pending

## 2021-09-06 NOTE — ED PROVIDER NOTE - OBJECTIVE STATEMENT
39 yo male states no pmhx +covid 8/31 here with persistent fever, hypoxia 89% tonight, taking tylenol for fever.  Minimal cough.  No chest pain.  Seen in ED 2x prior to today.  Not vaccinated

## 2021-09-06 NOTE — H&P ADULT - ASSESSMENT
41 yo M with no significant PMHx presents to the ED c/o hypoxia, admitted for acute hypoxic respiratory failure 2/2 COVID-19 pneumonia       39 yo M, unvaccinated for COVID, with no significant PMHx presents to the ED c/o hypoxia, admitted for acute hypoxic respiratory failure 2/2 COVID-19 pneumonia

## 2021-09-06 NOTE — H&P ADULT - NSHPREVIEWOFSYSTEMS_GEN_ALL_CORE
CONSTITUTIONAL: admits fever, denies chills, fatigue, weakness  HEENT: denies blurred visions, sore throat  SKIN: denies new lesions, rash  CARDIOVASCULAR: denies chest pain, chest pressure, palpitations  RESPIRATORY: admits shortness of breath, sputum production  GASTROINTESTINAL: denies nausea, vomiting, diarrhea, abdominal pain  GENITOURINARY: denies dysuria, discharge  NEUROLOGICAL: denies numbness, headache, focal weakness  MUSCULOSKELETAL: denies new joint pain, muscle aches  HEMATOLOGIC: denies gross bleeding, bruising  LYMPHATICS: denies enlarged lymph nodes, extremity swelling  PSYCHIATRIC: denies recent changes in anxiety, depression  ENDOCRINOLOGIC: denies sweating, cold or heat intolerance CONSTITUTIONAL: admits fever, fatigue, dizziness, denies chills  HEENT: denies blurred visions, sore throat  SKIN: denies new lesions, rash  CARDIOVASCULAR: denies chest pain, chest pressure, palpitations  RESPIRATORY: admits shortness of breath, sputum production  GASTROINTESTINAL: denies nausea, vomiting, diarrhea, abdominal pain  GENITOURINARY: denies dysuria, discharge  NEUROLOGICAL: denies numbness, headache, focal weakness  MUSCULOSKELETAL: admits R groin pain, denies new joint pain  HEMATOLOGIC: denies gross bleeding, bruising  LYMPHATICS: denies enlarged lymph nodes, extremity swelling  PSYCHIATRIC: denies recent changes in anxiety, depression  ENDOCRINOLOGIC: denies sweating, cold or heat intolerance CONSTITUTIONAL: admits fever, chills, fatigue, denies dizziness  HEENT: denies blurred visions, sore throat  SKIN: denies new lesions, rash  CARDIOVASCULAR: denies chest pain, chest pressure, palpitations  RESPIRATORY: admits shortness of breath, denies sputum production  GASTROINTESTINAL: admits nausea, vomiting, diarrhea, denies abdominal pain  GENITOURINARY: denies dysuria, discharge,   NEUROLOGICAL: denies numbness, headache, focal weakness  MUSCULOSKELETAL: admits R groin pain, denies new joint pain  HEMATOLOGIC: denies gross bleeding, bruising  LYMPHATICS: denies enlarged lymph nodes, extremity swelling  PSYCHIATRIC: denies recent changes in anxiety, depression  ENDOCRINOLOGIC: denies sweating, cold or heat intolerance CONSTITUTIONAL: admits fever, chills, fatigue, denies dizziness  HEENT: denies blurred visions, sore throat  SKIN: denies new lesions, rash  CARDIOVASCULAR: denies chest pain, chest pressure, palpitations  RESPIRATORY: admits shortness of breath, denies sputum production  GASTROINTESTINAL: admits nausea, vomiting, diarrhea, denies abdominal pain  GENITOURINARY: denies dysuria, discharge admits testicular pain  NEUROLOGICAL: denies numbness, headache, focal weakness  MUSCULOSKELETAL: denies new joint pain  HEMATOLOGIC: denies gross bleeding, bruising  LYMPHATICS: denies enlarged lymph nodes, extremity swelling  PSYCHIATRIC: denies recent changes in anxiety, depression  ENDOCRINOLOGIC: denies sweating, cold or heat intolerance

## 2021-09-06 NOTE — H&P ADULT - NSHPPHYSICALEXAM_GEN_ALL_CORE
T(C): 37.3 (09-06-21 @ 05:19), Max: 37.3 (09-06-21 @ 05:19)  HR: 97 (09-06-21 @ 05:23) (97 - 109)  BP: 137/79 (09-06-21 @ 05:19) (137/79 - 137/79)  RR: 20 (09-06-21 @ 05:23) (20 - 20)  SpO2: 96% (09-06-21 @ 05:23) (89% - 96%)    GENERAL: patient appears well, no acute distress, appropriate, pleasant, on 2LNC  EYES: sclera clear, no exudates  ENMT: oropharynx clear without erythema, no exudates, moist mucous membranes  NECK: supple, soft, no thyromegaly noted  LUNGS: decreased breath sounds b/l  HEART: soft S1/S2, regular rate and rhythm, no murmurs noted, no lower extremity edema  GASTROINTESTINAL: abdomen is soft, nontender, nondistended, normoactive bowel sounds, no palpable masses  INTEGUMENT: good skin turgor, no lesions noted  MUSCULOSKELETAL: no clubbing or cyanosis, no obvious deformity  NEUROLOGIC: awake, alert, oriented x3, good muscle tone in 4 extremities, no obvious sensory deficits  PSYCHIATRIC: mood is good, affect is congruent, linear and logical thought process  HEME/LYMPH: no palpable supraclavicular nodules, no obvious ecchymosis or petechiae T(C): 37.3 (09-06-21 @ 05:19), Max: 37.3 (09-06-21 @ 05:19)  HR: 97 (09-06-21 @ 05:23) (97 - 109)  BP: 137/79 (09-06-21 @ 05:19) (137/79 - 137/79)  RR: 20 (09-06-21 @ 05:23) (20 - 20)  SpO2: 96% (09-06-21 @ 05:23) (89% - 96%)    GENERAL: patient appears well, no acute distress, appropriate, pleasant, on 2LNC  EYES: sclera clear, no exudates  ENMT: oropharynx clear without erythema, no exudates, moist mucous membranes  NECK: supple, soft, no thyromegaly noted  LUNGS: decreased breath sounds b/l  HEART: soft S1/S2, regular rate and rhythm, no murmurs noted, no lower extremity edema  GASTROINTESTINAL: abdomen is soft, nontender, nondistended, normoactive bowel sounds, no palpable masses  GENITOURINARY: uncircumcised male with normal appearing testes bilaterally, L testicle mildly tender to palpation  INTEGUMENT: good skin turgor, warm, dry, normal color for race  MUSCULOSKELETAL: no clubbing or cyanosis, no obvious deformity  NEUROLOGIC: awake, alert, oriented x3, good muscle tone in 4 extremities, no obvious sensory deficits  PSYCHIATRIC: mood is good, affect is congruent, linear and logical thought process  HEME/LYMPH: no palpable supraclavicular nodules, no obvious ecchymosis or petechiae

## 2021-09-07 LAB
A1C WITH ESTIMATED AVERAGE GLUCOSE RESULT: 6.3 % — HIGH (ref 4–5.6)
ALBUMIN SERPL ELPH-MCNC: 3.6 G/DL — SIGNIFICANT CHANGE UP (ref 3.3–5)
ALP SERPL-CCNC: 39 U/L — LOW (ref 40–120)
ALT FLD-CCNC: 68 U/L — SIGNIFICANT CHANGE UP (ref 12–78)
ANION GAP SERPL CALC-SCNC: 6 MMOL/L — SIGNIFICANT CHANGE UP (ref 5–17)
AST SERPL-CCNC: 79 U/L — HIGH (ref 15–37)
BASOPHILS # BLD AUTO: 0 K/UL — SIGNIFICANT CHANGE UP (ref 0–0.2)
BASOPHILS NFR BLD AUTO: 0 % — SIGNIFICANT CHANGE UP (ref 0–2)
BILIRUB SERPL-MCNC: 0.3 MG/DL — SIGNIFICANT CHANGE UP (ref 0.2–1.2)
BUN SERPL-MCNC: 18 MG/DL — SIGNIFICANT CHANGE UP (ref 7–23)
CALCIUM SERPL-MCNC: 8.7 MG/DL — SIGNIFICANT CHANGE UP (ref 8.5–10.1)
CHLORIDE SERPL-SCNC: 103 MMOL/L — SIGNIFICANT CHANGE UP (ref 96–108)
CO2 SERPL-SCNC: 30 MMOL/L — SIGNIFICANT CHANGE UP (ref 22–31)
COVID-19 SPIKE DOMAIN AB INTERP: NEGATIVE — SIGNIFICANT CHANGE UP
COVID-19 SPIKE DOMAIN ANTIBODY RESULT: 0.4 U/ML — SIGNIFICANT CHANGE UP
CREAT SERPL-MCNC: 1.1 MG/DL — SIGNIFICANT CHANGE UP (ref 0.5–1.3)
EOSINOPHIL # BLD AUTO: 0 K/UL — SIGNIFICANT CHANGE UP (ref 0–0.5)
EOSINOPHIL NFR BLD AUTO: 0 % — SIGNIFICANT CHANGE UP (ref 0–6)
ESTIMATED AVERAGE GLUCOSE: 134 MG/DL — HIGH (ref 68–114)
GLUCOSE SERPL-MCNC: 99 MG/DL — SIGNIFICANT CHANGE UP (ref 70–99)
HCT VFR BLD CALC: 45 % — SIGNIFICANT CHANGE UP (ref 39–50)
HGB BLD-MCNC: 14.1 G/DL — SIGNIFICANT CHANGE UP (ref 13–17)
LYMPHOCYTES # BLD AUTO: 1.22 K/UL — SIGNIFICANT CHANGE UP (ref 1–3.3)
LYMPHOCYTES # BLD AUTO: 17 % — SIGNIFICANT CHANGE UP (ref 13–44)
MCHC RBC-ENTMCNC: 22.7 PG — LOW (ref 27–34)
MCHC RBC-ENTMCNC: 31.3 GM/DL — LOW (ref 32–36)
MCV RBC AUTO: 72.3 FL — LOW (ref 80–100)
MONOCYTES # BLD AUTO: 0.43 K/UL — SIGNIFICANT CHANGE UP (ref 0–0.9)
MONOCYTES NFR BLD AUTO: 6 % — SIGNIFICANT CHANGE UP (ref 2–14)
NEUTROPHILS # BLD AUTO: 5.39 K/UL — SIGNIFICANT CHANGE UP (ref 1.8–7.4)
NEUTROPHILS NFR BLD AUTO: 74 % — SIGNIFICANT CHANGE UP (ref 43–77)
NRBC # BLD: SIGNIFICANT CHANGE UP /100 WBCS (ref 0–0)
PLATELET # BLD AUTO: 228 K/UL — SIGNIFICANT CHANGE UP (ref 150–400)
POTASSIUM SERPL-MCNC: 4.9 MMOL/L — SIGNIFICANT CHANGE UP (ref 3.5–5.3)
POTASSIUM SERPL-SCNC: 4.9 MMOL/L — SIGNIFICANT CHANGE UP (ref 3.5–5.3)
PROT SERPL-MCNC: 8.7 G/DL — HIGH (ref 6–8.3)
RBC # BLD: 6.22 M/UL — HIGH (ref 4.2–5.8)
RBC # FLD: 17.7 % — HIGH (ref 10.3–14.5)
SARS-COV-2 IGG+IGM SERPL QL IA: 0.4 U/ML — SIGNIFICANT CHANGE UP
SARS-COV-2 IGG+IGM SERPL QL IA: NEGATIVE — SIGNIFICANT CHANGE UP
SODIUM SERPL-SCNC: 139 MMOL/L — SIGNIFICANT CHANGE UP (ref 135–145)
WBC # BLD: 7.18 K/UL — SIGNIFICANT CHANGE UP (ref 3.8–10.5)
WBC # FLD AUTO: 7.18 K/UL — SIGNIFICANT CHANGE UP (ref 3.8–10.5)

## 2021-09-07 PROCEDURE — 99233 SBSQ HOSP IP/OBS HIGH 50: CPT

## 2021-09-07 PROCEDURE — 76870 US EXAM SCROTUM: CPT | Mod: 26

## 2021-09-07 PROCEDURE — 99232 SBSQ HOSP IP/OBS MODERATE 35: CPT

## 2021-09-07 PROCEDURE — 93975 VASCULAR STUDY: CPT | Mod: 26

## 2021-09-07 RX ADMIN — REMDESIVIR 500 MILLIGRAM(S): 5 INJECTION INTRAVENOUS at 05:55

## 2021-09-07 RX ADMIN — ENOXAPARIN SODIUM 40 MILLIGRAM(S): 100 INJECTION SUBCUTANEOUS at 12:02

## 2021-09-07 RX ADMIN — Medication 6 MILLIGRAM(S): at 05:54

## 2021-09-08 LAB
ALBUMIN SERPL ELPH-MCNC: 3.3 G/DL — SIGNIFICANT CHANGE UP (ref 3.3–5)
ALP SERPL-CCNC: 38 U/L — LOW (ref 40–120)
ALT FLD-CCNC: 95 U/L — HIGH (ref 12–78)
ANION GAP SERPL CALC-SCNC: 7 MMOL/L — SIGNIFICANT CHANGE UP (ref 5–17)
AST SERPL-CCNC: 81 U/L — HIGH (ref 15–37)
BASOPHILS # BLD AUTO: 0.02 K/UL — SIGNIFICANT CHANGE UP (ref 0–0.2)
BASOPHILS NFR BLD AUTO: 0.2 % — SIGNIFICANT CHANGE UP (ref 0–2)
BILIRUB SERPL-MCNC: 0.5 MG/DL — SIGNIFICANT CHANGE UP (ref 0.2–1.2)
BUN SERPL-MCNC: 21 MG/DL — SIGNIFICANT CHANGE UP (ref 7–23)
CALCIUM SERPL-MCNC: 8.7 MG/DL — SIGNIFICANT CHANGE UP (ref 8.5–10.1)
CHLORIDE SERPL-SCNC: 102 MMOL/L — SIGNIFICANT CHANGE UP (ref 96–108)
CO2 SERPL-SCNC: 29 MMOL/L — SIGNIFICANT CHANGE UP (ref 22–31)
CREAT SERPL-MCNC: 1.1 MG/DL — SIGNIFICANT CHANGE UP (ref 0.5–1.3)
EOSINOPHIL # BLD AUTO: 0 K/UL — SIGNIFICANT CHANGE UP (ref 0–0.5)
EOSINOPHIL NFR BLD AUTO: 0 % — SIGNIFICANT CHANGE UP (ref 0–6)
GLUCOSE SERPL-MCNC: 94 MG/DL — SIGNIFICANT CHANGE UP (ref 70–99)
HCT VFR BLD CALC: 45 % — SIGNIFICANT CHANGE UP (ref 39–50)
HGB BLD-MCNC: 14.2 G/DL — SIGNIFICANT CHANGE UP (ref 13–17)
IMM GRANULOCYTES NFR BLD AUTO: 1.5 % — SIGNIFICANT CHANGE UP (ref 0–1.5)
LYMPHOCYTES # BLD AUTO: 1.64 K/UL — SIGNIFICANT CHANGE UP (ref 1–3.3)
LYMPHOCYTES # BLD AUTO: 19.1 % — SIGNIFICANT CHANGE UP (ref 13–44)
MCHC RBC-ENTMCNC: 22.8 PG — LOW (ref 27–34)
MCHC RBC-ENTMCNC: 31.6 GM/DL — LOW (ref 32–36)
MCV RBC AUTO: 72.3 FL — LOW (ref 80–100)
MONOCYTES # BLD AUTO: 0.6 K/UL — SIGNIFICANT CHANGE UP (ref 0–0.9)
MONOCYTES NFR BLD AUTO: 7 % — SIGNIFICANT CHANGE UP (ref 2–14)
NEUTROPHILS # BLD AUTO: 6.2 K/UL — SIGNIFICANT CHANGE UP (ref 1.8–7.4)
NEUTROPHILS NFR BLD AUTO: 72.2 % — SIGNIFICANT CHANGE UP (ref 43–77)
NRBC # BLD: 0 /100 WBCS — SIGNIFICANT CHANGE UP (ref 0–0)
PLATELET # BLD AUTO: 286 K/UL — SIGNIFICANT CHANGE UP (ref 150–400)
POTASSIUM SERPL-MCNC: 4.7 MMOL/L — SIGNIFICANT CHANGE UP (ref 3.5–5.3)
POTASSIUM SERPL-SCNC: 4.7 MMOL/L — SIGNIFICANT CHANGE UP (ref 3.5–5.3)
PROT SERPL-MCNC: 8.5 G/DL — HIGH (ref 6–8.3)
RBC # BLD: 6.22 M/UL — HIGH (ref 4.2–5.8)
RBC # FLD: 17.6 % — HIGH (ref 10.3–14.5)
SODIUM SERPL-SCNC: 138 MMOL/L — SIGNIFICANT CHANGE UP (ref 135–145)
WBC # BLD: 8.59 K/UL — SIGNIFICANT CHANGE UP (ref 3.8–10.5)
WBC # FLD AUTO: 8.59 K/UL — SIGNIFICANT CHANGE UP (ref 3.8–10.5)

## 2021-09-08 PROCEDURE — 99233 SBSQ HOSP IP/OBS HIGH 50: CPT

## 2021-09-08 RX ADMIN — Medication 6 MILLIGRAM(S): at 06:34

## 2021-09-08 RX ADMIN — ENOXAPARIN SODIUM 40 MILLIGRAM(S): 100 INJECTION SUBCUTANEOUS at 11:36

## 2021-09-08 RX ADMIN — REMDESIVIR 500 MILLIGRAM(S): 5 INJECTION INTRAVENOUS at 06:34

## 2021-09-08 NOTE — PROGRESS NOTE ADULT - PROBLEM SELECTOR PLAN 2
- pt c/o testicular pain, L>R with mild L testicular tenderness on exam  - testicular ultrasound was ordered and negative for torsion, noted to have b/l hydrocele
- pt c/o testicular pain, L>R with mild L testicular tenderness on exam  - testicular ultrasound was ordered and negative for torsion, noted to have b/l hydrocele

## 2021-09-08 NOTE — PROGRESS NOTE ADULT - PROBLEM SELECTOR PLAN 1
acute hypoxic respiratory failure 2/2 COVID-19 pneumonia  - patient currently saturating well on 2LNC  - supplemental O2 PRN to maintain O2 sats >90%, escalate therapy as appropriate  - monitor volume status closely, will exercise caution with aggressive hydration  - tylenol prn myalgias, fever  - daily labs to include: CBC with differential along with a CMP  - ferritin, crp, d-dimer, procal at admission then will repeat as clinically warranted  - continue VTE ppx with lovenox 40 mg qd  - continue dexamethasone x10 days and remdesivir x5 days  - no indication for tocilizumab at this time  - advanced care planning/code status: full code  - this tx plan was formulated utilizing my clinical judgement, currently available local/regional anecdotal information, organizational treatment recommendations with COVID-19 specific considerations given rapidly changing information available
acute hypoxic respiratory failure 2/2 COVID-19 pneumonia  - patient currently saturating well on 2LNC  - supplemental O2 PRN to maintain O2 sats >90%, escalate therapy as appropriate  - monitor volume status closely, will exercise caution with aggressive hydration  - tylenol prn myalgias, fever  - daily labs to include: CBC with differential along with a CMP  - ferritin, crp, d-dimer, procal at admission then will repeat as clinically warranted  - continue VTE ppx with lovenox 40 mg qd  - continue dexamethasone day 2/10 days and remdesivir day 2/5 days  - no indication for tocilizumab at this time  - advanced care planning/code status: full code  - this tx plan was formulated utilizing my clinical judgement, currently available local/regional anecdotal information, organizational treatment recommendations with COVID-19 specific considerations given rapidly changing information available

## 2021-09-09 VITALS
RESPIRATION RATE: 18 BRPM | HEART RATE: 88 BPM | SYSTOLIC BLOOD PRESSURE: 119 MMHG | TEMPERATURE: 98 F | DIASTOLIC BLOOD PRESSURE: 79 MMHG | OXYGEN SATURATION: 90 %

## 2021-09-09 LAB
ALBUMIN SERPL ELPH-MCNC: 3.2 G/DL — LOW (ref 3.3–5)
ALP SERPL-CCNC: 45 U/L — SIGNIFICANT CHANGE UP (ref 40–120)
ALT FLD-CCNC: 169 U/L — HIGH (ref 12–78)
ANION GAP SERPL CALC-SCNC: 6 MMOL/L — SIGNIFICANT CHANGE UP (ref 5–17)
AST SERPL-CCNC: 79 U/L — HIGH (ref 15–37)
BASOPHILS # BLD AUTO: 0.05 K/UL — SIGNIFICANT CHANGE UP (ref 0–0.2)
BASOPHILS NFR BLD AUTO: 0.6 % — SIGNIFICANT CHANGE UP (ref 0–2)
BILIRUB SERPL-MCNC: 0.5 MG/DL — SIGNIFICANT CHANGE UP (ref 0.2–1.2)
BUN SERPL-MCNC: 21 MG/DL — SIGNIFICANT CHANGE UP (ref 7–23)
CALCIUM SERPL-MCNC: 8.9 MG/DL — SIGNIFICANT CHANGE UP (ref 8.5–10.1)
CHLORIDE SERPL-SCNC: 103 MMOL/L — SIGNIFICANT CHANGE UP (ref 96–108)
CO2 SERPL-SCNC: 28 MMOL/L — SIGNIFICANT CHANGE UP (ref 22–31)
CREAT SERPL-MCNC: 0.93 MG/DL — SIGNIFICANT CHANGE UP (ref 0.5–1.3)
D DIMER BLD IA.RAPID-MCNC: <150 NG/ML DDU — SIGNIFICANT CHANGE UP
EOSINOPHIL # BLD AUTO: 0 K/UL — SIGNIFICANT CHANGE UP (ref 0–0.5)
EOSINOPHIL NFR BLD AUTO: 0 % — SIGNIFICANT CHANGE UP (ref 0–6)
GLUCOSE SERPL-MCNC: 92 MG/DL — SIGNIFICANT CHANGE UP (ref 70–99)
HCT VFR BLD CALC: 44.5 % — SIGNIFICANT CHANGE UP (ref 39–50)
HGB BLD-MCNC: 14.2 G/DL — SIGNIFICANT CHANGE UP (ref 13–17)
IMM GRANULOCYTES NFR BLD AUTO: 3 % — HIGH (ref 0–1.5)
LYMPHOCYTES # BLD AUTO: 1.98 K/UL — SIGNIFICANT CHANGE UP (ref 1–3.3)
LYMPHOCYTES # BLD AUTO: 22.9 % — SIGNIFICANT CHANGE UP (ref 13–44)
MCHC RBC-ENTMCNC: 22.8 PG — LOW (ref 27–34)
MCHC RBC-ENTMCNC: 31.9 GM/DL — LOW (ref 32–36)
MCV RBC AUTO: 71.5 FL — LOW (ref 80–100)
MONOCYTES # BLD AUTO: 0.76 K/UL — SIGNIFICANT CHANGE UP (ref 0–0.9)
MONOCYTES NFR BLD AUTO: 8.8 % — SIGNIFICANT CHANGE UP (ref 2–14)
NEUTROPHILS # BLD AUTO: 5.61 K/UL — SIGNIFICANT CHANGE UP (ref 1.8–7.4)
NEUTROPHILS NFR BLD AUTO: 64.7 % — SIGNIFICANT CHANGE UP (ref 43–77)
NRBC # BLD: 0 /100 WBCS — SIGNIFICANT CHANGE UP (ref 0–0)
PLATELET # BLD AUTO: 333 K/UL — SIGNIFICANT CHANGE UP (ref 150–400)
POTASSIUM SERPL-MCNC: 4.2 MMOL/L — SIGNIFICANT CHANGE UP (ref 3.5–5.3)
POTASSIUM SERPL-SCNC: 4.2 MMOL/L — SIGNIFICANT CHANGE UP (ref 3.5–5.3)
PROT SERPL-MCNC: 8.3 G/DL — SIGNIFICANT CHANGE UP (ref 6–8.3)
RBC # BLD: 6.22 M/UL — HIGH (ref 4.2–5.8)
RBC # FLD: 17.5 % — HIGH (ref 10.3–14.5)
SARS-COV-2 RNA SPEC QL NAA+PROBE: DETECTED
SODIUM SERPL-SCNC: 137 MMOL/L — SIGNIFICANT CHANGE UP (ref 135–145)
WBC # BLD: 8.66 K/UL — SIGNIFICANT CHANGE UP (ref 3.8–10.5)
WBC # FLD AUTO: 8.66 K/UL — SIGNIFICANT CHANGE UP (ref 3.8–10.5)

## 2021-09-09 PROCEDURE — 86140 C-REACTIVE PROTEIN: CPT

## 2021-09-09 PROCEDURE — 76870 US EXAM SCROTUM: CPT

## 2021-09-09 PROCEDURE — 84145 PROCALCITONIN (PCT): CPT

## 2021-09-09 PROCEDURE — U0003: CPT

## 2021-09-09 PROCEDURE — U0005: CPT

## 2021-09-09 PROCEDURE — 36415 COLL VENOUS BLD VENIPUNCTURE: CPT

## 2021-09-09 PROCEDURE — 80053 COMPREHEN METABOLIC PANEL: CPT

## 2021-09-09 PROCEDURE — 99239 HOSP IP/OBS DSCHRG MGMT >30: CPT

## 2021-09-09 PROCEDURE — 99285 EMERGENCY DEPT VISIT HI MDM: CPT

## 2021-09-09 PROCEDURE — 86769 SARS-COV-2 COVID-19 ANTIBODY: CPT

## 2021-09-09 PROCEDURE — 94660 CPAP INITIATION&MGMT: CPT

## 2021-09-09 PROCEDURE — 93005 ELECTROCARDIOGRAM TRACING: CPT

## 2021-09-09 PROCEDURE — 83036 HEMOGLOBIN GLYCOSYLATED A1C: CPT

## 2021-09-09 PROCEDURE — 93308 TTE F-UP OR LMTD: CPT

## 2021-09-09 PROCEDURE — 93306 TTE W/DOPPLER COMPLETE: CPT

## 2021-09-09 PROCEDURE — 93306 TTE W/DOPPLER COMPLETE: CPT | Mod: 26

## 2021-09-09 PROCEDURE — 85379 FIBRIN DEGRADATION QUANT: CPT

## 2021-09-09 PROCEDURE — 93975 VASCULAR STUDY: CPT

## 2021-09-09 PROCEDURE — 82728 ASSAY OF FERRITIN: CPT

## 2021-09-09 PROCEDURE — 87635 SARS-COV-2 COVID-19 AMP PRB: CPT

## 2021-09-09 PROCEDURE — 85025 COMPLETE CBC W/AUTO DIFF WBC: CPT

## 2021-09-09 PROCEDURE — 96374 THER/PROPH/DIAG INJ IV PUSH: CPT

## 2021-09-09 PROCEDURE — 71045 X-RAY EXAM CHEST 1 VIEW: CPT

## 2021-09-09 PROCEDURE — 99232 SBSQ HOSP IP/OBS MODERATE 35: CPT

## 2021-09-09 RX ORDER — ACETAMINOPHEN 500 MG
2 TABLET ORAL
Qty: 240 | Refills: 0
Start: 2021-09-09 | End: 2021-10-08

## 2021-09-09 RX ORDER — DEXAMETHASONE 0.5 MG/5ML
1 ELIXIR ORAL
Qty: 8 | Refills: 0
Start: 2021-09-09 | End: 2021-09-16

## 2021-09-09 RX ADMIN — ENOXAPARIN SODIUM 40 MILLIGRAM(S): 100 INJECTION SUBCUTANEOUS at 11:12

## 2021-09-09 RX ADMIN — Medication 6 MILLIGRAM(S): at 06:30

## 2021-09-09 RX ADMIN — REMDESIVIR 500 MILLIGRAM(S): 5 INJECTION INTRAVENOUS at 06:30

## 2021-09-09 NOTE — DISCHARGE NOTE PROVIDER - CARE PROVIDER_API CALL
Devyn Ruiz)  Family Medicine  [address], [address]  [place] [address]  Phone: [phone]  Fax: [phone]  \[facility]\"" Patient  Follow Up Time: 1 week    Sinan Damon)  Critical Care Medicine; Internal Medicine; Pulmonary Disease  [address] [place] [address], [address]  [place] [address]  Phone: [phone]  Fax: [phone]  Follow Up Time: 2 weeks    Sophia Martinez)  Infectious Disease; Internal Medicine  [address] [place] [address]  Phone: [phone]  Fax: [phone]  Follow Up Time:

## 2021-09-09 NOTE — DISCHARGE NOTE PROVIDER - HOSPITAL COURSE
39 yo M, unvaccinated for COVID, with no significant PMHx presents to the ED c/o hypoxia, admitted for acute hypoxic respiratory failure 2/2 COVID-19 pneumonia Pt was placed on supplemental O2 and was seen by pulmonology as well as infectious diseases. He was started on remdesmivir and decadron while monitored clinically. Pt showed some improvement and is stable to go home on home supplemental oxygen. Pt desaturated on day of discharge to 86% on room air at rest and will be requiring home O2 on discharge for COVID PNA. Pt was instructed to quarantine until 9/14/2021 and practice social distancing while at home. Pt was also recommended to vaccinate against covid once he back in his normal state of health and no longer requiring oxygen supplementation. Pt is stable for discharge home at this time with close follow up with PMD.         PHYSICAL EXAM:  GENERAL: patient appears well, no acute distress, appropriately interactive, on 2L NC  LUNGS: good air entry bilaterally, diminished in L lower lung field on auscultation however good effort  HEART: soft S1/S2, regular rate and rhythm, no murmurs noted, no noted edema to b/l LE  GASTROINTESTINAL: abdomen is soft, nontender, nondistended, normoactive bowel sounds, no palpable masses  NEUROLOGIC: awake, alert, oriented x3, good muscle tone in 4 extremities, no obvious sensory deficits  PSYCHIATRIC: mood is good, affect is congruent with mood, linear and logical thought process  INTEGUMENT: good skin turgor, warm, dry, normal color for race  MUSCULOSKELETAL: no clubbing or cyanosis, no obvious deformity      Time spent: 55 minutes 39 yo M, unvaccinated for COVID, with no significant PMHx presents to the ED c/o hypoxia, admitted for acute hypoxic respiratory failure 2/2 COVID-19 pneumonia Pt was placed on supplemental O2 and was seen by pulmonology as well as infectious diseases. He was started on remdesmivir and decadron while monitored clinically. Pt showed some improvement and is stable to go home on home supplemental oxygen. Pt desaturated on day of discharge to 86% on room air at rest and will be requiring home O2 on discharge for COVID PNA. Pt was instructed to quarantine until 9/14/2021 and practice social distancing while at home. Pt was also recommended to vaccinate against covid once he back in his normal state of health and no longer requiring oxygen supplementation. Prior to discharge an echo was performed at request of patient who's friend is an RN and was concerned for any cardiac pathology related to covid. ECHO was read as within normal limited. Pt is stable for discharge home at this time with close follow up with PMD.         PHYSICAL EXAM:  GENERAL: patient appears well, no acute distress, appropriately interactive, on 2L NC  LUNGS: good air entry bilaterally, diminished in L lower lung field on auscultation however good effort  HEART: soft S1/S2, regular rate and rhythm, no murmurs noted, no noted edema to b/l LE  GASTROINTESTINAL: abdomen is soft, nontender, nondistended, normoactive bowel sounds, no palpable masses  NEUROLOGIC: awake, alert, oriented x3, good muscle tone in 4 extremities, no obvious sensory deficits  PSYCHIATRIC: mood is good, affect is congruent with mood, linear and logical thought process  INTEGUMENT: good skin turgor, warm, dry, normal color for race  MUSCULOSKELETAL: no clubbing or cyanosis, no obvious deformity      Time spent: 55 minutes

## 2021-09-09 NOTE — DISCHARGE NOTE NURSING/CASE MANAGEMENT/SOCIAL WORK - PATIENT PORTAL LINK FT
walks daily, elipticals, climbs stairs
You can access the FollowMyHealth Patient Portal offered by [facility] by registering at the following website: [email or web address]. By joining [employer]’s FollowMyHealth portal, you will also be able to view your health information using other applications (apps) compatible with our system.

## 2021-09-09 NOTE — DISCHARGE NOTE PROVIDER - NSDCMRMEDTOKEN_GEN_ALL_CORE_FT
acetaminophen 325 mg oral tablet: 2 tab(s) orally every 6 hours, As needed, Temp greater or equal to 38C (100.4F)  dexamethasone 6 mg oral tablet: 1 tab(s) orally once a day   guaiFENesin 100 mg/5 mL oral liquid: 10 milliliter(s) orally every 6 hours, As needed, Cough

## 2021-09-09 NOTE — DISCHARGE NOTE PROVIDER - PROVIDER TOKENS
PROVIDER:[TOKEN:[[id number]],FOLLOWUP:[1 week],ESTABLISHEDPATIENT:[T]],PROVIDER:[TOKEN:[[id number]],FOLLOWUP:[2 weeks]],PROVIDER:[TOKEN:[[id number]]]

## 2021-09-09 NOTE — PROGRESS NOTE ADULT - REASON FOR ADMISSION
acute hypoxic resp failure 2/2 COVID-19

## 2021-09-09 NOTE — PROGRESS NOTE ADULT - ASSESSMENT
41 yo M, unvaccinated for COVID, with no significant PMHx presents to the ED c/o hypoxia, admitted for acute hypoxic respiratory failure 2/2 COVID-19 pneumonia      
pt with covid - viral pna - natalee - obesity - hypoxemia    US testicles noted - hydrocele  vs noted  labs reviewed    o2 support  fio2 titration  pronate as tolerated  monitor VS and HD  decadron and remdesivir - for covid - viral pna with hypoxemia -   known NATALEE - does not use Device - sleep hygiene reviewed  tylenol and robitussin PRN use for sx management  dvt p  serial D dimer  emotional support
pt with covid - viral pna - natalee - obesity - hypoxemia    overnight events noted - remains on o2 support - vs noted - Labs reviewed -     o2 support  fio2 titration  pronate as tolerated  monitor VS and HD  decadron and remdesivir - for covid - viral pna with hypoxemia -   known NATALEE - does not use Device - sleep hygiene reviewed  tylenol and robitussin PRN use for sx management  dvt p  serial D dimer  emotional support
41 yo M, unvaccinated for COVID, with no significant PMHx presents to the ED c/o hypoxia, admitted for acute hypoxic respiratory failure 2/2 COVID-19 pneumonia      
pt with covid - viral pna - natalee - obesity - hypoxemia    on low rate O2 support - wean as tolerated -   discussed CPAP night time for NATALEE -     US testicles noted - hydrocele  vs noted  labs reviewed    o2 support  fio2 titration  pronate as tolerated  monitor VS and HD  decadron and remdesivir - for covid - viral pna with hypoxemia -   known NATALEE - does not use Device - sleep hygiene reviewed  tylenol and robitussin PRN use for sx management  dvt p  serial D dimer  emotional support

## 2021-09-09 NOTE — DISCHARGE NOTE PROVIDER - CARE PROVIDERS DIRECT ADDRESSES
,DirectAddress_Unknown,[email or web address]

## 2021-09-09 NOTE — PROGRESS NOTE ADULT - SUBJECTIVE AND OBJECTIVE BOX
Date/Time Patient Seen:  		  Referring MD:   Data Reviewed	       Patient is a 40y old  Male who presents with a chief complaint of acute hypoxic resp failure 2/2 COVID-19 (08 Sep 2021 10:10)      Subjective/HPI     PAST MEDICAL & SURGICAL HISTORY:  No pertinent past medical history    No significant past surgical history          Medication list         MEDICATIONS  (STANDING):  dexAMETHasone  Injectable 6 milliGRAM(s) IV Push daily  enoxaparin Injectable 40 milliGRAM(s) SubCutaneous daily  influenza   Vaccine 0.5 milliLiter(s) IntraMuscular once  remdesivir  IVPB   IV Intermittent   remdesivir  IVPB 100 milliGRAM(s) IV Intermittent every 24 hours    MEDICATIONS  (PRN):  acetaminophen   Tablet .. 650 milliGRAM(s) Oral every 6 hours PRN Temp greater or equal to 38C (100.4F)  guaiFENesin Oral Liquid (Sugar-Free) 200 milliGRAM(s) Oral every 6 hours PRN Cough         Vitals log        ICU Vital Signs Last 24 Hrs  T(C): 36.8 (09 Sep 2021 04:51), Max: 36.8 (09 Sep 2021 04:51)  T(F): 98.3 (09 Sep 2021 04:51), Max: 98.3 (09 Sep 2021 04:51)  HR: 86 (09 Sep 2021 04:51) (78 - 91)  BP: 100/56 (09 Sep 2021 04:51) (100/56 - 145/92)  BP(mean): --  ABP: --  ABP(mean): --  RR: 17 (09 Sep 2021 04:51) (17 - 18)  SpO2: 92% (09 Sep 2021 04:51) (90% - 92%)           Input and Output:  I&O's Detail    07 Sep 2021 07:01  -  08 Sep 2021 07:00  --------------------------------------------------------  IN:    Oral Fluid: 300 mL  Total IN: 300 mL    OUT:  Total OUT: 0 mL    Total NET: 300 mL      08 Sep 2021 07:01  -  09 Sep 2021 05:49  --------------------------------------------------------  IN:    Oral Fluid: 300 mL  Total IN: 300 mL    OUT:  Total OUT: 0 mL    Total NET: 300 mL          Lab Data                        14.2   8.59  )-----------( 286      ( 08 Sep 2021 09:20 )             45.0     09-08    138  |  102  |  21  ----------------------------<  94  4.7   |  29  |  1.10    Ca    8.7      08 Sep 2021 09:20    TPro  8.5<H>  /  Alb  3.3  /  TBili  0.5  /  DBili  x   /  AST  81<H>  /  ALT  95<H>  /  AlkPhos  38<L>  09-08            Review of Systems	      Objective     Physical Examination    heart s1s2  lung dec BS  abd soft  head nc      Pertinent Lab findings & Imaging      Michele:  NO   Adequate UO     I&O's Detail    07 Sep 2021 07:01  -  08 Sep 2021 07:00  --------------------------------------------------------  IN:    Oral Fluid: 300 mL  Total IN: 300 mL    OUT:  Total OUT: 0 mL    Total NET: 300 mL      08 Sep 2021 07:01  -  09 Sep 2021 05:49  --------------------------------------------------------  IN:    Oral Fluid: 300 mL  Total IN: 300 mL    OUT:  Total OUT: 0 mL    Total NET: 300 mL               Discussed with:     Cultures:	        Radiology                            
[facility]  INFECTIOUS DISEASES   =======================================================    MRN-[id number]  YOSSI MICHELLE     Follow up: COVID-19     Doing better, on 2L NC with no discomfort and NAD.  No fever. Going home with O2. Doing better.     PAST MEDICAL & SURGICAL HISTORY:  No pertinent past medical history  No significant past surgical history    Social Hx: no smoking, ETOH or drugs     FAMILY HISTORY:  FH: HTN (hypertension) (Father, Mother, Sibling)    Allergies  No Known Allergies    Antibiotics:  MEDICATIONS  (STANDING):  dexAMETHasone  Injectable 6 milliGRAM(s) IV Push daily  enoxaparin Injectable 40 milliGRAM(s) SubCutaneous daily  remdesivir  IVPB   IV Intermittent     REVIEW OF SYSTEMS:  CONSTITUTIONAL:  No Fever or chills  HEENT:  No diplopia or blurred vision.  No sore throat or runny nose.  CARDIOVASCULAR:  No chest pain or SOB.  RESPIRATORY:  +cough, +shortness of breath  GASTROINTESTINAL:  No nausea, vomiting or diarrhea.  GENITOURINARY:  No dysuria, frequency or urgency. No Blood in urine  MUSCULOSKELETAL:  no joint aches, no muscle pain  SKIN:  No change in skin, hair or nails.  NEUROLOGIC:  No paresthesias, fasciculations, seizures or weakness.  PSYCHIATRIC:  No disorder of thought or mood.  ENDOCRINE:  No heat or cold intolerance, polyuria or polydipsia.  HEMATOLOGICAL:  No easy bruising or bleeding.     Physical Exam:  Vital Signs Last 24 Hrs  T(C): 36.5 (09 Sep 2021 13:30), Max: 36.8 (09 Sep 2021 04:51)  T(F): 97.7 (09 Sep 2021 13:30), Max: 98.3 (09 Sep 2021 04:51)  HR: 88 (09 Sep 2021 13:30) (78 - 88)  BP: 119/79 (09 Sep 2021 13:30) (100/56 - 145/92)  BP(mean): --  RR: 18 (09 Sep 2021 13:30) (17 - 18)  SpO2: 90% (09 Sep 2021 13:30) (86% - 92%)  GEN: NAD, obese   HEENT: normocephalic and atraumatic. EOMI. PERRL.    NECK: Supple.  No lymphadenopathy   LUNGS: Clear to auscultation.   HEART: Regular rate and rhythm   ABDOMEN: Soft, nontender, and nondistended.  Positive bowel sounds.    EXTREMITIES: Without edema.  NEUROLOGIC: grossly intact.  PSYCHIATRIC: Appropriate affect .  SKIN: No rash    Labs:                        14.2   8.66  )-----------( 333      ( 09 Sep 2021 08:22 )             44.5     09-09    137  |  103  |  21  ----------------------------<  92  4.2   |  28  |  0.93    Ca    8.9      09 Sep 2021 08:22    TPro  8.3  /  Alb  3.2<L>  /  TBili  0.5  /  DBili  x   /  AST  79<H>  /  ALT  169<H>  /  AlkPhos  45  09-09    WBC Count: 8.66 K/uL (09-09-21 @ 08:22)  WBC Count: 8.59 K/uL (09-08-21 @ 09:20)  WBC Count: 7.18 K/uL (09-07-21 @ 09:18)  WBC Count: 7.31 K/uL (09-06-21 @ 09:39)  WBC Count: 5.67 K/uL (09-04-21 @ 20:19)    Creatinine, Serum: 0.93 mg/dL (09-09-21 @ 08:22)  Creatinine, Serum: 1.10 mg/dL (09-08-21 @ 09:20)  Creatinine, Serum: 1.10 mg/dL (09-07-21 @ 09:18)  Creatinine, Serum: 1.20 mg/dL (09-06-21 @ 06:01)  Creatinine, Serum: 1.20 mg/dL (09-04-21 @ 20:48)    C-Reactive Protein, Serum: 72 mg/L (09-06-21 @ 10:37)    Ferritin, Serum: 696 ng/mL (09-06-21 @ 10:40)    Procalcitonin, Serum: 0.14 ng/mL (09-06-21 @ 06:01)     COVID-19 PCR: Detected (09-06-21 @ 05:59)  COVID-19 PCR: Detected (09-04-21 @ 20:19)    All imaging and other data have been reviewed.  < from: Xray Chest 1 View- PORTABLE-Urgent (09.04.21 @ 20:10) >  IMPRESSION: Patchy infiltrate/atelectasis left base. Recommend clinical correlation and follow-up    Assessment and Plan:   39 yo man with no significant PMH was admitted with hypoxia. Recently diagnosed with COVID-19 on 08/31.   Symptoms started on 8/25 as per him.   Not vaccinated for COVID-19. On 2L O2 with NC good sat.   Treatment usually is different case by case, data suggest that these might work:   Remdisivir:  5 day course , ALT < 5X ULN  Steroid: For hypoxic patients on supplemental O2 of intubated. dexamethasone 6mg PO or IV Q-day x 10 days (equivalent to solumedrol 32mg IV or Prednisone 40mg)  Anticoagulation:  with prophylactic dosing, full dose to be considered in patients with increased risk for thromboembolic complications. Bleeding can happen but acceptable in high risk patients due to hypercoagulable state.  LMWH is good, for high risk patients consider discharge on oral anticoagulation with rivaroxaban (Xarelto) 10mg PO QD or Eliquis (apixaban) 2.5-5mg PO BID  x 4 weeks.  Currently data and recommendations for COVID-19 treatment are rapidly changing, so this treatment plan is based on my clinical judgement with available information.     COVID 19   - BMP, CBC w diff, NLR. Procalcitonin, Ferritin, CRP, LDH and D dimer for the start and periodically can be repeated.   - CXR with bilateral opacities more consistent with viral pneumonia   - Continue Remdesivir for total 5days or until discharge whichever comes first. today day 4th  - Continue Dexamethasone 6mg po daily for 10days  - Follow Creat and LFTs daily while on Remdesivir.    - Watch O2 sat closely and taper as tolerated.   - No antibiotics at this time.  - Prophylactic anticoagulation as per protocol  - No Tocilizumab at this time.     Will sign off please call with any quaestoris.     Dr. Sophia Martinez   Infectious Diseases   Please call [phone] between 8am and 6pm, call [phone] after 6pm or weekends.   
[facility]  INFECTIOUS DISEASES   =======================================================    MRN-[id number]  YOSSI MICHELLE     Follow up: COVID-19     Doing better, on 2L NC with no discomfort and NAD.  No fever.    PAST MEDICAL & SURGICAL HISTORY:  No pertinent past medical history  No significant past surgical history    Social Hx: no smoking, ETOH or drugs     FAMILY HISTORY:  FH: HTN (hypertension) (Father, Mother, Sibling)    Allergies  No Known Allergies    Antibiotics:  MEDICATIONS  (STANDING):  dexAMETHasone  Injectable 6 milliGRAM(s) IV Push daily  enoxaparin Injectable 40 milliGRAM(s) SubCutaneous daily  remdesivir  IVPB   IV Intermittent     REVIEW OF SYSTEMS:  CONSTITUTIONAL:  No Fever or chills  HEENT:  No diplopia or blurred vision.  No sore throat or runny nose.  CARDIOVASCULAR:  No chest pain or SOB.  RESPIRATORY:  +cough, +shortness of breath  GASTROINTESTINAL:  No nausea, vomiting or diarrhea.  GENITOURINARY:  No dysuria, frequency or urgency. No Blood in urine  MUSCULOSKELETAL:  no joint aches, no muscle pain  SKIN:  No change in skin, hair or nails.  NEUROLOGIC:  No paresthesias, fasciculations, seizures or weakness.  PSYCHIATRIC:  No disorder of thought or mood.  ENDOCRINE:  No heat or cold intolerance, polyuria or polydipsia.  HEMATOLOGICAL:  No easy bruising or bleeding.     Physical Exam:  Vital Signs Last 24 Hrs  T(C): 36.9 (07 Sep 2021 05:31), Max: 36.9 (06 Sep 2021 13:16)  T(F): 98.5 (07 Sep 2021 05:31), Max: 98.5 (07 Sep 2021 05:31)  HR: 92 (07 Sep 2021 05:31) (89 - 104)  BP: 127/79 (07 Sep 2021 05:31) (127/79 - 153/94)  BP(mean): --  RR: 18 (07 Sep 2021 05:31) (18 - 19)  SpO2: 93% (07 Sep 2021 05:31) (90% - 93%)  GEN: NAD, obese   HEENT: normocephalic and atraumatic. EOMI. PERRL.    NECK: Supple.  No lymphadenopathy   LUNGS: Clear to auscultation.   HEART: Regular rate and rhythm   ABDOMEN: Soft, nontender, and nondistended.  Positive bowel sounds.    EXTREMITIES: Without edema.  NEUROLOGIC: grossly intact.  PSYCHIATRIC: Appropriate affect .  SKIN: No rash    Labs:                        14.1   7.18  )-----------( 228      ( 07 Sep 2021 09:18 )             45.0     09-07    139  |  103  |  18  ----------------------------<  99  4.9   |  30  |  1.10    Ca    8.7      07 Sep 2021 09:18    TPro  8.7<H>  /  Alb  3.6  /  TBili  0.3  /  DBili  x   /  AST  79<H>  /  ALT  68  /  AlkPhos  39<L>  09-07    WBC Count: 7.18 K/uL (09-07-21 @ 09:18)  WBC Count: 7.31 K/uL (09-06-21 @ 09:39)  WBC Count: 5.67 K/uL (09-04-21 @ 20:19)    Creatinine, Serum: 1.10 mg/dL (09-07-21 @ 09:18)  Creatinine, Serum: 1.20 mg/dL (09-06-21 @ 06:01)  Creatinine, Serum: 1.20 mg/dL (09-04-21 @ 20:48)    C-Reactive Protein, Serum: 72 mg/L (09-06-21 @ 10:37)    Ferritin, Serum: 696 ng/mL (09-06-21 @ 10:40)    Procalcitonin, Serum: 0.14 ng/mL (09-06-21 @ 06:01)     COVID-19 PCR: Detected (09-06-21 @ 05:59)  COVID-19 PCR: Detected (09-04-21 @ 20:19)    All imaging and other data have been reviewed.  < from: Xray Chest 1 View- PORTABLE-Urgent (09.04.21 @ 20:10) >  IMPRESSION: Patchy infiltrate/atelectasis left base. Recommend clinical correlation and follow-up    Assessment and Plan:   39 yo man with no significant PMH was admitted with hypoxia. Recently diagnosed with COVID-19 on 08/31.   Symptoms started on 8/25 as per him.   Not vaccinated for COVID-19. On 2L O2 with NC good sat.   Treatment usually is different case by case, data suggest that these might work:   Remdisivir:  5 day course , ALT < 5X ULN  Steroid: For hypoxic patients on supplemental O2 of intubated. dexamethasone 6mg PO or IV Q-day x 10 days (equivalent to solumedrol 32mg IV or Prednisone 40mg)  Anticoagulation:  with prophylactic dosing, full dose to be considered in patients with increased risk for thromboembolic complications. Bleeding can happen but acceptable in high risk patients due to hypercoagulable state.  LMWH is good, for high risk patients consider discharge on oral anticoagulation with rivaroxaban (Xarelto) 10mg PO QD or Eliquis (apixaban) 2.5-5mg PO BID  x 4 weeks.  Currently data and recommendations for COVID-19 treatment are rapidly changing, so this treatment plan is based on my clinical judgement with available information.     COVID 19   - BMP, CBC w diff, NLR. Procalcitonin, Ferritin, CRP, LDH and D dimer for the start and periodically can be repeated.   - CXR with bilateral opacities more consistent with viral pneumonia   - Continue Remdesivir for total 5days or until discharge whichever comes first.   - Continue Dexamethasone 6mg po daily for 10days  - Follow Creat and LFTs daily while on Remdesivir.    - Watch O2 sat closely and taper as tolerated.   - No antibiotics at this time.  - Prophylactic anticoagulation as per protocol  - No Tocilizumab at this time.     Will follow PRN.     Dr. Sophia Martinez   Infectious Diseases   Please call [phone] between 8am and 6pm, call [phone] after 6pm or weekends.     
Date/Time Patient Seen:  		  Referring MD:   Data Reviewed	       Patient is a 40y old  Male who presents with a chief complaint of acute hypoxic resp failure 2/2 COVID-19 (06 Sep 2021 12:04)      Subjective/HPI     PAST MEDICAL & SURGICAL HISTORY:  No pertinent past medical history    No significant past surgical history          Medication list         MEDICATIONS  (STANDING):  dexAMETHasone  Injectable 6 milliGRAM(s) IV Push daily  enoxaparin Injectable 40 milliGRAM(s) SubCutaneous daily  influenza   Vaccine 0.5 milliLiter(s) IntraMuscular once  remdesivir  IVPB   IV Intermittent   remdesivir  IVPB 100 milliGRAM(s) IV Intermittent every 24 hours    MEDICATIONS  (PRN):  acetaminophen   Tablet .. 650 milliGRAM(s) Oral every 6 hours PRN Temp greater or equal to 38C (100.4F)  guaiFENesin Oral Liquid (Sugar-Free) 200 milliGRAM(s) Oral every 6 hours PRN Cough         Vitals log        ICU Vital Signs Last 24 Hrs  T(C): 36.9 (07 Sep 2021 05:31), Max: 37.6 (06 Sep 2021 08:58)  T(F): 98.5 (07 Sep 2021 05:31), Max: 99.7 (06 Sep 2021 08:58)  HR: 92 (07 Sep 2021 05:31) (89 - 104)  BP: 127/79 (07 Sep 2021 05:31) (127/79 - 153/94)  BP(mean): --  ABP: --  ABP(mean): --  RR: 18 (07 Sep 2021 05:31) (18 - 19)  SpO2: 93% (07 Sep 2021 05:31) (89% - 95%)           Input and Output:  I&O's Detail    06 Sep 2021 07:01  -  07 Sep 2021 05:51  --------------------------------------------------------  IN:    Oral Fluid: 300 mL  Total IN: 300 mL    OUT:  Total OUT: 0 mL    Total NET: 300 mL          Lab Data                        13.9   7.31  )-----------( 183      ( 06 Sep 2021 09:39 )             42.7     09-06    134<L>  |  101  |  10  ----------------------------<  109<H>  4.2   |  26  |  1.20    Ca    8.6      06 Sep 2021 06:01    TPro  8.6<H>  /  Alb  3.7  /  TBili  0.4  /  DBili  x   /  AST  67<H>  /  ALT  47  /  AlkPhos  39<L>  09-06            Review of Systems	      Objective     Physical Examination    heart s1s2  lung dec BS  abd soft      Pertinent Lab findings & Imaging      Michele:  NO   Adequate UO     I&O's Detail    06 Sep 2021 07:01  -  07 Sep 2021 05:51  --------------------------------------------------------  IN:    Oral Fluid: 300 mL  Total IN: 300 mL    OUT:  Total OUT: 0 mL    Total NET: 300 mL               Discussed with:     Cultures:	        Radiology                            
Date/Time Patient Seen:  		  Referring MD:   Data Reviewed	       Patient is a 40y old  Male who presents with a chief complaint of acute hypoxic resp failure 2/2 COVID-19 (07 Sep 2021 15:49)      Subjective/HPI     PAST MEDICAL & SURGICAL HISTORY:  No pertinent past medical history    No significant past surgical history          Medication list         MEDICATIONS  (STANDING):  dexAMETHasone  Injectable 6 milliGRAM(s) IV Push daily  enoxaparin Injectable 40 milliGRAM(s) SubCutaneous daily  influenza   Vaccine 0.5 milliLiter(s) IntraMuscular once  remdesivir  IVPB   IV Intermittent   remdesivir  IVPB 100 milliGRAM(s) IV Intermittent every 24 hours    MEDICATIONS  (PRN):  acetaminophen   Tablet .. 650 milliGRAM(s) Oral every 6 hours PRN Temp greater or equal to 38C (100.4F)  guaiFENesin Oral Liquid (Sugar-Free) 200 milliGRAM(s) Oral every 6 hours PRN Cough         Vitals log        ICU Vital Signs Last 24 Hrs  T(C): 36.3 (08 Sep 2021 05:14), Max: 36.8 (07 Sep 2021 13:41)  T(F): 97.4 (08 Sep 2021 05:14), Max: 98.3 (07 Sep 2021 13:41)  HR: 98 (08 Sep 2021 05:14) (75 - 98)  BP: 133/95 (08 Sep 2021 05:14) (121/71 - 133/95)  BP(mean): --  ABP: --  ABP(mean): --  RR: 17 (08 Sep 2021 05:14) (17 - 18)  SpO2: 92% (08 Sep 2021 05:14) (89% - 92%)           Input and Output:  I&O's Detail    06 Sep 2021 07:01  -  07 Sep 2021 07:00  --------------------------------------------------------  IN:    Oral Fluid: 300 mL  Total IN: 300 mL    OUT:  Total OUT: 0 mL    Total NET: 300 mL      07 Sep 2021 07:01  -  08 Sep 2021 05:54  --------------------------------------------------------  IN:    Oral Fluid: 300 mL  Total IN: 300 mL    OUT:  Total OUT: 0 mL    Total NET: 300 mL          Lab Data                        14.1   7.18  )-----------( 228      ( 07 Sep 2021 09:18 )             45.0     09-07    139  |  103  |  18  ----------------------------<  99  4.9   |  30  |  1.10    Ca    8.7      07 Sep 2021 09:18    TPro  8.7<H>  /  Alb  3.6  /  TBili  0.3  /  DBili  x   /  AST  79<H>  /  ALT  68  /  AlkPhos  39<L>  09-07            Review of Systems	      Objective     Physical Examination    heart s1s2  lung dec BS  abd soft  head nc      Pertinent Lab findings & Imaging      Michele:  NO   Adequate UO     I&O's Detail    06 Sep 2021 07:01  -  07 Sep 2021 07:00  --------------------------------------------------------  IN:    Oral Fluid: 300 mL  Total IN: 300 mL    OUT:  Total OUT: 0 mL    Total NET: 300 mL      07 Sep 2021 07:01  -  08 Sep 2021 05:54  --------------------------------------------------------  IN:    Oral Fluid: 300 mL  Total IN: 300 mL    OUT:  Total OUT: 0 mL    Total NET: 300 mL               Discussed with:     Cultures:	        Radiology                            
Patient is a 40y old  Male who presents with a chief complaint of acute hypoxic resp failure 2/2 COVID-19 (07 Sep 2021 12:17)      FROM ADMISSION H+P:   HPI:  39 yo M, unvaccinated for COVID, with no significant PMHx presents to the ED c/o hypoxia. Recently diagnosed with COVID-19 on 08/31. Had traveled to [place] and returned on 08/29. Not vaccinated for COVID-19. Received a course of Azithromycin started on 9/1 which he completed today. Has been taking Tylenol as needed with minimal relief.   Was in the ED 9/3 and 9/4 but was discharged home and did not require O2 at that time. He reports oxygen desaturations to <90% at home with fevers ranging 101F - 104F per pt. He has had increased dyspnea on exertion, diarrhea (nonbloody), loss of taste, but denies loss of smell.     In the ED, VS T99.2F  -> 97 /79 RR 20 SpO2 89% on RA -> 96% on 2LNC. CBC, CMP, CRP, Ferritin, Procal, D-dimer collected in the ED. Significant for   Received 6 mg Dexamethasone IV x1, 1L NS bolus x1 in the ED.  CXR showing L base infiltrate, official read pending  EKG from prior visit reviewed showing NSR with nonspecific T wave abnormality, no prior EKG for comparison, new EKG pending (06 Sep 2021 05:45)      ----  INTERVAL HPI/OVERNIGHT EVENTS: Pt seen and evaluated at the bedside. No acute overnight events occurred. Patient states he is feeling well, denies SOB, chest pain, palpitations, abd pain. He was able to sleep well overnight and wants to go home. Saturating 88-92% on 2L NC    ----  PAST MEDICAL & SURGICAL HISTORY:  No pertinent past medical history    No significant past surgical history        FAMILY HISTORY:  FH: HTN (hypertension) (Father, Mother, Sibling)        Home Medications:      Allergies    No Known Allergies    Intolerances        ----  MEDICATIONS  (STANDING):  dexAMETHasone  Injectable 6 milliGRAM(s) IV Push daily  enoxaparin Injectable 40 milliGRAM(s) SubCutaneous daily  influenza   Vaccine 0.5 milliLiter(s) IntraMuscular once  remdesivir  IVPB   IV Intermittent   remdesivir  IVPB 100 milliGRAM(s) IV Intermittent every 24 hours    MEDICATIONS  (PRN):  acetaminophen   Tablet .. 650 milliGRAM(s) Oral every 6 hours PRN Temp greater or equal to 38C (100.4F)  guaiFENesin Oral Liquid (Sugar-Free) 200 milliGRAM(s) Oral every 6 hours PRN Cough      ----  REVIEW OF SYSTEMS: as per HPI    ----  PHYSICAL EXAM:  GENERAL: patient appears well, no acute distress, appropriately interactive, on 2L NC  NECK: supple, soft, no thyromegaly noted  LUNGS: good air entry bilaterally, clear to auscultation, symmetric breath sounds, no wheezing or rhonchi appreciated  HEART: soft S1/S2, regular rate and rhythm, no murmurs noted, no noted edema to b/l LE  GASTROINTESTINAL: abdomen is soft, nontender, nondistended, normoactive bowel sounds, no palpable masses  NEUROLOGIC: awake, alert, oriented x3, good muscle tone in 4 extremities, no obvious sensory deficits  PSYCHIATRIC: mood is good, affect is congruent with mood, linear and logical thought process    T(C): 36.8 (09-07-21 @ 13:41), Max: 36.9 (09-07-21 @ 05:31)  HR: 81 (09-07-21 @ 13:41) (81 - 92)  BP: 121/71 (09-07-21 @ 13:41) (121/71 - 129/86)  RR: 18 (09-07-21 @ 13:41) (18 - 19)  SpO2: 89% (09-07-21 @ 13:41) (89% - 93%)  Wt(kg): --    ----  I&O's Summary    06 Sep 2021 07:01  -  07 Sep 2021 07:00  --------------------------------------------------------  IN: 300 mL / OUT: 0 mL / NET: 300 mL    07 Sep 2021 07:01  -  07 Sep 2021 15:50  --------------------------------------------------------  IN: 300 mL / OUT: 0 mL / NET: 300 mL        LABS:                        14.1   7.18  )-----------( 228      ( 07 Sep 2021 09:18 )             45.0     09-07    139  |  103  |  18  ----------------------------<  99  4.9   |  30  |  1.10    Ca    8.7      07 Sep 2021 09:18    TPro  8.7<H>  /  Alb  3.6  /  TBili  0.3  /  DBili  x   /  AST  79<H>  /  ALT  68  /  AlkPhos  39<L>  09-07        CAPILLARY BLOOD GLUCOSE                    ----  Personally reviewed:  Vital sign trends: [X ] yes    [  ] no     [  ] n/a  Laboratory results: [X  ] yes    [  ] no     [  ] n/a  Radiology results: [ X ] yes    [  ] no     [  ] n/a  Culture results: [ X ] yes    [  ] no     [  ] n/a  Consultant recommendations: [ X ] yes    [  ] no     [  ] n/a        
Patient is a 40y old  Male who presents with a chief complaint of acute hypoxic resp failure 2/2 COVID-19 (08 Sep 2021 05:54)      INTERVAL HPI: Pt seen and examined. States he is feeling better, still has some cough and shortness of breath but feel its improving. States at night feels like his breathing gets more labored.     OVERNIGHT EVENTS: none noted  T(F): 98 (09-08-21 @ 13:40), Max: 98 (09-08-21 @ 13:40)  HR: 91 (09-08-21 @ 13:40) (75 - 98)  BP: 123/79 (09-08-21 @ 13:40) (123/79 - 133/95)  RR: 18 (09-08-21 @ 13:40) (17 - 18)  SpO2: 91% (09-08-21 @ 13:40) (90% - 92%)  I&O's Summary    07 Sep 2021 07:01  -  08 Sep 2021 07:00  --------------------------------------------------------  IN: 300 mL / OUT: 0 mL / NET: 300 mL    08 Sep 2021 07:01  -  08 Sep 2021 14:11  --------------------------------------------------------  IN: 300 mL / OUT: 0 mL / NET: 300 mL        REVIEW OF SYSTEMS: as per HPI    ----  PHYSICAL EXAM:  GENERAL: patient appears well, no acute distress, appropriately interactive, on 2L NC  LUNGS: good air entry bilaterally, clear to auscultation, symmetric breath sounds, no wheezing or rhonchi appreciated  HEART: soft S1/S2, regular rate and rhythm, no murmurs noted, no noted edema to b/l LE  GASTROINTESTINAL: abdomen is soft, nontender, nondistended, normoactive bowel sounds, no palpable masses  NEUROLOGIC: awake, alert, oriented x3, good muscle tone in 4 extremities, no obvious sensory deficits  PSYCHIATRIC: mood is good, affect is congruent with mood, linear and logical thought process  INTEGUMENT: good skin turgor, warm, dry, normal color for race  MUSCULOSKELETAL: no clubbing or cyanosis, no obvious deformity    LABS:                        14.2   8.59  )-----------( 286      ( 08 Sep 2021 09:20 )             45.0     09-08    138  |  102  |  21  ----------------------------<  94  4.7   |  29  |  1.10    Ca    8.7      08 Sep 2021 09:20    TPro  8.5<H>  /  Alb  3.3  /  TBili  0.5  /  DBili  x   /  AST  81<H>  /  ALT  95<H>  /  AlkPhos  38<L>  09-08        CAPILLARY BLOOD GLUCOSE                  MEDICATIONS  (STANDING):  dexAMETHasone  Injectable 6 milliGRAM(s) IV Push daily  enoxaparin Injectable 40 milliGRAM(s) SubCutaneous daily  influenza   Vaccine 0.5 milliLiter(s) IntraMuscular once  remdesivir  IVPB   IV Intermittent   remdesivir  IVPB 100 milliGRAM(s) IV Intermittent every 24 hours    MEDICATIONS  (PRN):  acetaminophen   Tablet .. 650 milliGRAM(s) Oral every 6 hours PRN Temp greater or equal to 38C (100.4F)  guaiFENesin Oral Liquid (Sugar-Free) 200 milliGRAM(s) Oral every 6 hours PRN Cough

## 2021-09-09 NOTE — DISCHARGE NOTE PROVIDER - NSDCCPCAREPLAN_GEN_ALL_CORE_FT
PRINCIPAL DISCHARGE DIAGNOSIS  Diagnosis: Pneumonia due to COVID-19 virus  Assessment and Plan of Treatment: you were given supplemental oxygen as well some doses of remdesmivir and decadron with close clinical monitoring, you showed improvement in your symptoms and were deemed by the primary medical team, pulm and infectious disease to be discharged home on supplemental oxygen to continue to recover from this covid pna, you will need to quarantine until through tues 9/14/2021 also you  may obtain vaccination once you are back at your normal state of health without oxygen need, please obtain a pulse oxymeter and assure your SaO2 level is above 92%, complete decadron as indicated, follow up with your primary medical provider within 1 week of discharge for further evaluation and management, a number of a pulmonologist and infectious disease provider has also been provided if you choose follow up or have any questions on your covid recovery

## 2021-11-05 NOTE — DISCHARGE NOTE NURSING/CASE MANAGEMENT/SOCIAL WORK - NSDCPETBCESMAN_GEN_ALL_CORE
If you are a smoker, it is important for your health to stop smoking. Please be aware that second hand smoke is also harmful. hair removal not indicated

## 2023-03-09 NOTE — ED ADULT NURSE NOTE - SUICIDE SCREENING QUESTION 3
Nutrition Education  Consult received for CHF diet education. Provided pt with written and verbal instruction on HF nutrition therapy. Discussed low sodium diet, daily weights, and fluid restriction. Pt voiced understanding and stated not using a lot of salt already, but eats mostly processed foods. Talked over low sodium phrases to look for on packaging for acceptable processed food pt could buy. Time spent: 10 minutes    Educated on CHF diet guidelines  Learners: Patient  Readiness: Acceptance  Method: Explanation and Handout  Response: Verbalizes Understanding  Contact name and number provided.     [address] Number: Office: [phone]; [address]: [phone]
No

## 2023-08-14 NOTE — ED ADULT NURSE NOTE - PAIN: PRESENCE, MLM
Watch for fever, vaginal discharge, odor, abdominal pain,nausea/vomiting, worse - follow-up immediately or go to ER if worse    Close follow-up PMD  
denies pain/discomfort

## 2024-04-25 NOTE — ED PROVIDER NOTE - CONSTITUTIONAL [+], MLM
Detail Level: Zone
Render In Strict Bullet Format?: No
Continue Regimen: Clobex 0.05 % shampoo \\nclobetasol 0.05 % scalp solution
FEVER
Initiate Treatment: tretinoin 0.025 % topical cream QHS
Continue Regimen: azaleic acid cream qd
Initiate Treatment: triamcinolone acetonide 0.1 % topical cream BID

## 2024-11-20 NOTE — ED ADULT TRIAGE NOTE - HEIGHT IN CM
Mary Man  Cardiovascular Disease  [address] [place] [id number]  Phone: [phone]  Fax: [phone]  Established Patient  Follow Up Time: 1 week   180.34

## 2024-12-05 NOTE — ED ADULT TRIAGE NOTE - NS ED TRIAGE AVPU SCALE
Results relayed to patient daughter in law.  There was an appointment scheduled for 12/11/2024 and patient does not necessarily need this appointment.   Script for new nebulizer supplies has been sent and orders entered for CT.  Appointment rescheduled for 3/24/2024 and patient daughter in law transferred to set this up.  All questions answered at this time.    Alert-The patient is alert, awake and responds to voice. The patient is oriented to time, place, and person. The triage nurse is able to obtain subjective information.
